# Patient Record
Sex: FEMALE | Race: WHITE | NOT HISPANIC OR LATINO | Employment: FULL TIME | URBAN - METROPOLITAN AREA
[De-identification: names, ages, dates, MRNs, and addresses within clinical notes are randomized per-mention and may not be internally consistent; named-entity substitution may affect disease eponyms.]

---

## 2023-03-21 DIAGNOSIS — E78.1 PURE HYPERGLYCERIDEMIA: ICD-10-CM

## 2023-03-21 DIAGNOSIS — E11.9 TYPE 2 DIABETES MELLITUS WITHOUT COMPLICATIONS (MULTI): ICD-10-CM

## 2023-03-22 RX ORDER — ROSUVASTATIN CALCIUM 20 MG/1
TABLET, COATED ORAL
Qty: 90 TABLET | Refills: 1 | Status: SHIPPED | OUTPATIENT
Start: 2023-03-22 | End: 2023-10-04

## 2023-03-22 RX ORDER — DAPAGLIFLOZIN 10 MG/1
TABLET, FILM COATED ORAL
Qty: 90 TABLET | Refills: 1 | Status: SHIPPED | OUTPATIENT
Start: 2023-03-22 | End: 2023-10-05

## 2023-04-08 DIAGNOSIS — E55.9 VITAMIN D DEFICIENCY, UNSPECIFIED: ICD-10-CM

## 2023-04-11 DIAGNOSIS — E66.9 OBESITY, UNSPECIFIED CLASSIFICATION, UNSPECIFIED OBESITY TYPE, UNSPECIFIED WHETHER SERIOUS COMORBIDITY PRESENT: ICD-10-CM

## 2023-04-11 DIAGNOSIS — E11.9 CONTROLLED TYPE 2 DIABETES MELLITUS WITHOUT COMPLICATION, WITHOUT LONG-TERM CURRENT USE OF INSULIN (MULTI): ICD-10-CM

## 2023-04-11 DIAGNOSIS — G35 MULTIPLE SCLEROSIS (MULTI): ICD-10-CM

## 2023-04-11 DIAGNOSIS — I10 BENIGN ESSENTIAL HYPERTENSION: ICD-10-CM

## 2023-04-11 DIAGNOSIS — E55.9 VITAMIN D DEFICIENCY: ICD-10-CM

## 2023-04-11 DIAGNOSIS — E78.00 ELEVATED LDL CHOLESTEROL LEVEL: ICD-10-CM

## 2023-04-11 DIAGNOSIS — J30.2 SEASONAL ALLERGIES: ICD-10-CM

## 2023-04-11 DIAGNOSIS — R79.89 ABNORMAL CBC: Primary | ICD-10-CM

## 2023-04-11 DIAGNOSIS — E78.1 FAMILIAL HYPERTRIGLYCERIDEMIA: ICD-10-CM

## 2023-04-11 RX ORDER — ASPIRIN 325 MG
TABLET, DELAYED RELEASE (ENTERIC COATED) ORAL
Qty: 12 CAPSULE | Refills: 1 | Status: SHIPPED | OUTPATIENT
Start: 2023-04-11 | End: 2023-09-22

## 2023-04-14 DIAGNOSIS — E11.9 TYPE 2 DIABETES MELLITUS WITHOUT COMPLICATIONS (MULTI): ICD-10-CM

## 2023-04-14 RX ORDER — METFORMIN HYDROCHLORIDE 500 MG/1
TABLET, EXTENDED RELEASE ORAL
Qty: 90 TABLET | Refills: 1 | Status: SHIPPED | OUTPATIENT
Start: 2023-04-14 | End: 2023-10-05

## 2023-04-17 ENCOUNTER — LAB (OUTPATIENT)
Dept: LAB | Facility: LAB | Age: 58
End: 2023-04-17
Payer: COMMERCIAL

## 2023-04-17 ENCOUNTER — DOCUMENTATION (OUTPATIENT)
Dept: PRIMARY CARE | Facility: CLINIC | Age: 58
End: 2023-04-17

## 2023-04-17 DIAGNOSIS — E78.00 ELEVATED LDL CHOLESTEROL LEVEL: ICD-10-CM

## 2023-04-17 DIAGNOSIS — G35 MULTIPLE SCLEROSIS (MULTI): ICD-10-CM

## 2023-04-17 DIAGNOSIS — I10 BENIGN ESSENTIAL HYPERTENSION: ICD-10-CM

## 2023-04-17 DIAGNOSIS — E78.1 FAMILIAL HYPERTRIGLYCERIDEMIA: ICD-10-CM

## 2023-04-17 DIAGNOSIS — R79.89 ABNORMAL CBC: ICD-10-CM

## 2023-04-17 DIAGNOSIS — J30.2 SEASONAL ALLERGIES: ICD-10-CM

## 2023-04-17 DIAGNOSIS — E66.9 OBESITY, UNSPECIFIED CLASSIFICATION, UNSPECIFIED OBESITY TYPE, UNSPECIFIED WHETHER SERIOUS COMORBIDITY PRESENT: ICD-10-CM

## 2023-04-17 DIAGNOSIS — E11.9 CONTROLLED TYPE 2 DIABETES MELLITUS WITHOUT COMPLICATION, WITHOUT LONG-TERM CURRENT USE OF INSULIN (MULTI): ICD-10-CM

## 2023-04-17 DIAGNOSIS — E55.9 VITAMIN D DEFICIENCY: ICD-10-CM

## 2023-04-17 LAB
ALANINE AMINOTRANSFERASE (SGPT) (U/L) IN SER/PLAS: 39 U/L (ref 7–45)
ALBUMIN (G/DL) IN SER/PLAS: 4.3 G/DL (ref 3.4–5)
ALBUMIN (MG/L) IN URINE: <7 MG/L
ALBUMIN/CREATININE (UG/MG) IN URINE: NORMAL UG/MG CRT (ref 0–30)
ALKALINE PHOSPHATASE (U/L) IN SER/PLAS: 63 U/L (ref 33–110)
ANION GAP IN SER/PLAS: 14 MMOL/L (ref 10–20)
ASPARTATE AMINOTRANSFERASE (SGOT) (U/L) IN SER/PLAS: 34 U/L (ref 9–39)
BILIRUBIN TOTAL (MG/DL) IN SER/PLAS: 0.8 MG/DL (ref 0–1.2)
CALCIDIOL (25 OH VITAMIN D3) (NG/ML) IN SER/PLAS: 66 NG/ML
CALCIUM (MG/DL) IN SER/PLAS: 9 MG/DL (ref 8.6–10.6)
CARBON DIOXIDE, TOTAL (MMOL/L) IN SER/PLAS: 27 MMOL/L (ref 21–32)
CHLORIDE (MMOL/L) IN SER/PLAS: 104 MMOL/L (ref 98–107)
CHOLESTEROL (MG/DL) IN SER/PLAS: 75 MG/DL (ref 0–199)
CHOLESTEROL IN HDL (MG/DL) IN SER/PLAS: 32.7 MG/DL
CHOLESTEROL/HDL RATIO: 2.3
CREATININE (MG/DL) IN SER/PLAS: 0.54 MG/DL (ref 0.5–1.05)
CREATININE (MG/DL) IN URINE: 23.7 MG/DL (ref 20–320)
ERYTHROCYTE DISTRIBUTION WIDTH (RATIO) BY AUTOMATED COUNT: 12.2 % (ref 11.5–14.5)
ERYTHROCYTE MEAN CORPUSCULAR HEMOGLOBIN CONCENTRATION (G/DL) BY AUTOMATED: 34.5 G/DL (ref 32–36)
ERYTHROCYTE MEAN CORPUSCULAR VOLUME (FL) BY AUTOMATED COUNT: 86 FL (ref 80–100)
ERYTHROCYTES (10*6/UL) IN BLOOD BY AUTOMATED COUNT: 4.81 X10E12/L (ref 4–5.2)
ESTIMATED AVERAGE GLUCOSE FOR HBA1C: 146 MG/DL
GFR FEMALE: >90 ML/MIN/1.73M2
GLUCOSE (MG/DL) IN SER/PLAS: 127 MG/DL (ref 74–99)
HEMATOCRIT (%) IN BLOOD BY AUTOMATED COUNT: 41.5 % (ref 36–46)
HEMOGLOBIN (G/DL) IN BLOOD: 14.3 G/DL (ref 12–16)
HEMOGLOBIN A1C/HEMOGLOBIN TOTAL IN BLOOD: 6.7 %
LDL: 23 MG/DL (ref 0–99)
LEUKOCYTES (10*3/UL) IN BLOOD BY AUTOMATED COUNT: 7.4 X10E9/L (ref 4.4–11.3)
NRBC (PER 100 WBCS) BY AUTOMATED COUNT: 0 /100 WBC (ref 0–0)
PLATELETS (10*3/UL) IN BLOOD AUTOMATED COUNT: 308 X10E9/L (ref 150–450)
POTASSIUM (MMOL/L) IN SER/PLAS: 4.2 MMOL/L (ref 3.5–5.3)
PROTEIN TOTAL: 6.7 G/DL (ref 6.4–8.2)
SODIUM (MMOL/L) IN SER/PLAS: 141 MMOL/L (ref 136–145)
THYROTROPIN (MIU/L) IN SER/PLAS BY DETECTION LIMIT <= 0.05 MIU/L: 3.67 MIU/L (ref 0.44–3.98)
TRIGLYCERIDE (MG/DL) IN SER/PLAS: 99 MG/DL (ref 0–149)
UREA NITROGEN (MG/DL) IN SER/PLAS: 9 MG/DL (ref 6–23)
VLDL: 20 MG/DL (ref 0–40)

## 2023-04-17 PROCEDURE — 82570 ASSAY OF URINE CREATININE: CPT

## 2023-04-17 PROCEDURE — 80061 LIPID PANEL: CPT

## 2023-04-17 PROCEDURE — 82043 UR ALBUMIN QUANTITATIVE: CPT

## 2023-04-17 PROCEDURE — 80053 COMPREHEN METABOLIC PANEL: CPT

## 2023-04-17 PROCEDURE — 85027 COMPLETE CBC AUTOMATED: CPT

## 2023-04-17 PROCEDURE — 84443 ASSAY THYROID STIM HORMONE: CPT

## 2023-04-17 PROCEDURE — 82306 VITAMIN D 25 HYDROXY: CPT

## 2023-04-17 PROCEDURE — 36415 COLL VENOUS BLD VENIPUNCTURE: CPT

## 2023-04-17 PROCEDURE — 83036 HEMOGLOBIN GLYCOSYLATED A1C: CPT

## 2023-04-17 NOTE — RESULT ENCOUNTER NOTE
HDL continues to be low at 32.7, continue to improve diet and exercise.    Hemoglobin A1c at 6.7, slightly up from last lab.  Still below diabetic goal of 7.    Remaining labs unremarkable.

## 2023-04-19 ENCOUNTER — OFFICE VISIT (OUTPATIENT)
Dept: PRIMARY CARE | Facility: CLINIC | Age: 58
End: 2023-04-19
Payer: COMMERCIAL

## 2023-04-19 VITALS
WEIGHT: 240 LBS | SYSTOLIC BLOOD PRESSURE: 144 MMHG | DIASTOLIC BLOOD PRESSURE: 84 MMHG | OXYGEN SATURATION: 96 % | HEART RATE: 90 BPM | BODY MASS INDEX: 37.67 KG/M2 | TEMPERATURE: 98.3 F | HEIGHT: 67 IN

## 2023-04-19 DIAGNOSIS — I10 BENIGN ESSENTIAL HYPERTENSION: Primary | ICD-10-CM

## 2023-04-19 DIAGNOSIS — G35 MULTIPLE SCLEROSIS (MULTI): ICD-10-CM

## 2023-04-19 DIAGNOSIS — E78.6 LOW HDL (UNDER 40): ICD-10-CM

## 2023-04-19 DIAGNOSIS — E11.9 CONTROLLED TYPE 2 DIABETES MELLITUS WITHOUT COMPLICATION, WITHOUT LONG-TERM CURRENT USE OF INSULIN (MULTI): ICD-10-CM

## 2023-04-19 DIAGNOSIS — E55.9 VITAMIN D DEFICIENCY: ICD-10-CM

## 2023-04-19 DIAGNOSIS — E78.1 FAMILIAL HYPERTRIGLYCERIDEMIA: ICD-10-CM

## 2023-04-19 PROBLEM — N95.1 MENOPAUSAL SYMPTOM: Status: ACTIVE | Noted: 2023-04-19

## 2023-04-19 PROBLEM — S86.009A ACHILLES TENDON INJURY: Status: ACTIVE | Noted: 2023-04-19

## 2023-04-19 PROBLEM — R09.81 NASAL CONGESTION: Status: ACTIVE | Noted: 2023-04-19

## 2023-04-19 PROBLEM — J02.9 SORE THROAT: Status: ACTIVE | Noted: 2023-04-19

## 2023-04-19 PROBLEM — R63.5 WEIGHT GAIN: Status: ACTIVE | Noted: 2023-04-19

## 2023-04-19 PROBLEM — N92.6 IRREGULAR MENSES: Status: ACTIVE | Noted: 2023-04-19

## 2023-04-19 PROCEDURE — 3044F HG A1C LEVEL LT 7.0%: CPT | Performed by: STUDENT IN AN ORGANIZED HEALTH CARE EDUCATION/TRAINING PROGRAM

## 2023-04-19 PROCEDURE — 3077F SYST BP >= 140 MM HG: CPT | Performed by: STUDENT IN AN ORGANIZED HEALTH CARE EDUCATION/TRAINING PROGRAM

## 2023-04-19 PROCEDURE — 1036F TOBACCO NON-USER: CPT | Performed by: STUDENT IN AN ORGANIZED HEALTH CARE EDUCATION/TRAINING PROGRAM

## 2023-04-19 PROCEDURE — 3079F DIAST BP 80-89 MM HG: CPT | Performed by: STUDENT IN AN ORGANIZED HEALTH CARE EDUCATION/TRAINING PROGRAM

## 2023-04-19 PROCEDURE — 99214 OFFICE O/P EST MOD 30 MIN: CPT | Performed by: STUDENT IN AN ORGANIZED HEALTH CARE EDUCATION/TRAINING PROGRAM

## 2023-04-19 RX ORDER — AMLODIPINE AND OLMESARTAN MEDOXOMIL 5; 20 MG/1; MG/1
1 TABLET ORAL DAILY
COMMUNITY
Start: 2021-09-01 | End: 2023-10-05

## 2023-04-19 RX ORDER — ASPIRIN 325 MG
1 TABLET, DELAYED RELEASE (ENTERIC COATED) ORAL
COMMUNITY
Start: 2015-07-02 | End: 2023-10-18 | Stop reason: SDUPTHER

## 2023-04-19 RX ORDER — FLUTICASONE PROPIONATE 50 MCG
2 SPRAY, SUSPENSION (ML) NASAL DAILY
COMMUNITY
End: 2024-01-22

## 2023-04-19 RX ORDER — ICOSAPENT ETHYL 1 G/1
2 CAPSULE ORAL 2 TIMES DAILY
COMMUNITY
End: 2023-08-29

## 2023-04-19 RX ORDER — GLATIRAMER 40 MG/ML
INJECTION, SOLUTION SUBCUTANEOUS
COMMUNITY
Start: 2015-01-05 | End: 2023-10-11 | Stop reason: SDUPTHER

## 2023-04-19 ASSESSMENT — PATIENT HEALTH QUESTIONNAIRE - PHQ9
2. FEELING DOWN, DEPRESSED OR HOPELESS: NOT AT ALL
1. LITTLE INTEREST OR PLEASURE IN DOING THINGS: NOT AT ALL
SUM OF ALL RESPONSES TO PHQ9 QUESTIONS 1 AND 2: 0

## 2023-04-19 ASSESSMENT — ENCOUNTER SYMPTOMS
DEPRESSION: 0
LOSS OF SENSATION IN FEET: 0
OCCASIONAL FEELINGS OF UNSTEADINESS: 0

## 2023-04-19 ASSESSMENT — COLUMBIA-SUICIDE SEVERITY RATING SCALE - C-SSRS
2. HAVE YOU ACTUALLY HAD ANY THOUGHTS OF KILLING YOURSELF?: NO
1. IN THE PAST MONTH, HAVE YOU WISHED YOU WERE DEAD OR WISHED YOU COULD GO TO SLEEP AND NOT WAKE UP?: NO
6. HAVE YOU EVER DONE ANYTHING, STARTED TO DO ANYTHING, OR PREPARED TO DO ANYTHING TO END YOUR LIFE?: NO

## 2023-04-19 NOTE — PROGRESS NOTES
57-year-old female presenting for follow-up on chronic conditions.  Doing relatively well without any new concerns.    HTN  Stable, tolerating current regimen well.    DMII  Stable, tolerating current regimen well.    Low HDL, hypertriglyceridemia  Stable, tolerating statin, continued low HDL.    Vitamin D deficiency  Stable, tolerating current regimen well.    MS  Stable, doing very well on current regimen.  Just establish with new neurologist.    12 point ROS reviewed and negative other than as stated in HPI    General: Alert, oriented, pleasant, in no acute distress  HEENT:      Head: normocephalic, atraumatic;      eyes: EOMI, no scleral icterus;   CV: Heart with regular rate and rhythm, normal S1/S2, no murmurs  Lungs: CTAB without wheezing, rhonchi or rales; good respiratory effort, no increased work of breathing  Neuro: Cranial nerves grossly intact; alert and oriented  Psych: Appropriate mood and affect    1.  HTN  -Continue amlodipine-olmesartan 5-20 mg daily    2. DMII  -Last A1C: 6.7   4/2023  -Current Medications: Farxiga 10 mg daily, metformin  mg daily (highest tolerated dose)  -Statin: rosuvastatin 20 mg daily  -ASA: 81 mg daily  -ACE/ARB: Olmesartan 20 mg daily    3. Low HDL 4. Hypertriglyceridemia  -HDL continuously below goal, recommend increased exercise  -Continue rosuvastatin 20 mg daily    5. Vitamin D deficiency  -Refill supplements    6.  MS  - Continue to follow with neurology    Follow-up CPE October    Christopher D'Amico, DO

## 2023-08-29 DIAGNOSIS — E78.1 PURE HYPERGLYCERIDEMIA: ICD-10-CM

## 2023-08-29 RX ORDER — ICOSAPENT ETHYL 1 G/1
2 CAPSULE ORAL 2 TIMES DAILY
Qty: 360 CAPSULE | Refills: 1 | Status: SHIPPED | OUTPATIENT
Start: 2023-08-29 | End: 2024-05-15

## 2023-09-22 DIAGNOSIS — E55.9 VITAMIN D DEFICIENCY, UNSPECIFIED: ICD-10-CM

## 2023-09-22 RX ORDER — ASPIRIN 325 MG
TABLET, DELAYED RELEASE (ENTERIC COATED) ORAL
Qty: 12 CAPSULE | Refills: 1 | Status: SHIPPED | OUTPATIENT
Start: 2023-09-22 | End: 2024-03-06

## 2023-10-04 DIAGNOSIS — E78.1 PURE HYPERGLYCERIDEMIA: ICD-10-CM

## 2023-10-04 DIAGNOSIS — E11.9 TYPE 2 DIABETES MELLITUS WITHOUT COMPLICATIONS (MULTI): ICD-10-CM

## 2023-10-04 DIAGNOSIS — I10 ESSENTIAL (PRIMARY) HYPERTENSION: ICD-10-CM

## 2023-10-04 RX ORDER — ROSUVASTATIN CALCIUM 20 MG/1
TABLET, COATED ORAL
Qty: 90 TABLET | Refills: 1 | Status: SHIPPED | OUTPATIENT
Start: 2023-10-04 | End: 2024-03-18

## 2023-10-05 RX ORDER — AMLODIPINE AND OLMESARTAN MEDOXOMIL 5; 20 MG/1; MG/1
1 TABLET ORAL DAILY
Qty: 90 TABLET | Refills: 1 | Status: SHIPPED | OUTPATIENT
Start: 2023-10-05 | End: 2024-03-18

## 2023-10-05 RX ORDER — METFORMIN HYDROCHLORIDE 500 MG/1
TABLET, EXTENDED RELEASE ORAL
Qty: 90 TABLET | Refills: 1 | Status: SHIPPED | OUTPATIENT
Start: 2023-10-05 | End: 2024-03-18

## 2023-10-05 RX ORDER — DAPAGLIFLOZIN 10 MG/1
10 TABLET, FILM COATED ORAL
Qty: 90 TABLET | Refills: 1 | Status: SHIPPED | OUTPATIENT
Start: 2023-10-05 | End: 2024-03-18

## 2023-10-09 DIAGNOSIS — I10 BENIGN ESSENTIAL HYPERTENSION: ICD-10-CM

## 2023-10-09 DIAGNOSIS — E78.1 FAMILIAL HYPERTRIGLYCERIDEMIA: ICD-10-CM

## 2023-10-09 DIAGNOSIS — G35 MULTIPLE SCLEROSIS (MULTI): ICD-10-CM

## 2023-10-09 DIAGNOSIS — E55.9 VITAMIN D DEFICIENCY: Primary | ICD-10-CM

## 2023-10-09 DIAGNOSIS — E11.9 CONTROLLED TYPE 2 DIABETES MELLITUS WITHOUT COMPLICATION, WITHOUT LONG-TERM CURRENT USE OF INSULIN (MULTI): ICD-10-CM

## 2023-10-09 DIAGNOSIS — E78.6 LOW HDL (UNDER 40): ICD-10-CM

## 2023-10-11 ENCOUNTER — TELEPHONE (OUTPATIENT)
Dept: NEUROLOGY | Facility: CLINIC | Age: 58
End: 2023-10-11
Payer: COMMERCIAL

## 2023-10-11 DIAGNOSIS — G35 MULTIPLE SCLEROSIS (MULTI): Primary | ICD-10-CM

## 2023-10-11 RX ORDER — GLATIRAMER 40 MG/ML
40 INJECTION, SOLUTION SUBCUTANEOUS 3 TIMES WEEKLY
Qty: 12 ML | Refills: 5 | Status: CANCELLED | OUTPATIENT
Start: 2023-10-11

## 2023-10-12 RX ORDER — GLATIRAMER 40 MG/ML
40 INJECTION, SOLUTION SUBCUTANEOUS 3 TIMES WEEKLY
Qty: 12 ML | Refills: 5 | Status: SHIPPED | OUTPATIENT
Start: 2023-10-13 | End: 2024-04-17

## 2023-10-16 ENCOUNTER — LAB (OUTPATIENT)
Dept: LAB | Facility: LAB | Age: 58
End: 2023-10-16
Payer: COMMERCIAL

## 2023-10-16 DIAGNOSIS — I10 BENIGN ESSENTIAL HYPERTENSION: ICD-10-CM

## 2023-10-16 DIAGNOSIS — G35 MULTIPLE SCLEROSIS (MULTI): ICD-10-CM

## 2023-10-16 DIAGNOSIS — E78.6 LOW HDL (UNDER 40): ICD-10-CM

## 2023-10-16 DIAGNOSIS — E78.1 FAMILIAL HYPERTRIGLYCERIDEMIA: ICD-10-CM

## 2023-10-16 DIAGNOSIS — E55.9 VITAMIN D DEFICIENCY: ICD-10-CM

## 2023-10-16 DIAGNOSIS — E11.9 CONTROLLED TYPE 2 DIABETES MELLITUS WITHOUT COMPLICATION, WITHOUT LONG-TERM CURRENT USE OF INSULIN (MULTI): ICD-10-CM

## 2023-10-16 LAB
25(OH)D3 SERPL-MCNC: 68 NG/ML (ref 30–100)
ALBUMIN SERPL BCP-MCNC: 4.1 G/DL (ref 3.4–5)
ALP SERPL-CCNC: 65 U/L (ref 33–110)
ALT SERPL W P-5'-P-CCNC: 45 U/L (ref 7–45)
ANION GAP SERPL CALC-SCNC: 14 MMOL/L (ref 10–20)
AST SERPL W P-5'-P-CCNC: 45 U/L (ref 9–39)
BILIRUB SERPL-MCNC: 0.8 MG/DL (ref 0–1.2)
BUN SERPL-MCNC: 10 MG/DL (ref 6–23)
CALCIUM SERPL-MCNC: 8.9 MG/DL (ref 8.6–10.6)
CHLORIDE SERPL-SCNC: 105 MMOL/L (ref 98–107)
CHOLEST SERPL-MCNC: 76 MG/DL (ref 0–199)
CHOLESTEROL/HDL RATIO: 2.1
CO2 SERPL-SCNC: 27 MMOL/L (ref 21–32)
CREAT SERPL-MCNC: 0.58 MG/DL (ref 0.5–1.05)
CREAT UR-MCNC: 195.5 MG/DL (ref 20–320)
ERYTHROCYTE [DISTWIDTH] IN BLOOD BY AUTOMATED COUNT: 11.7 % (ref 11.5–14.5)
EST. AVERAGE GLUCOSE BLD GHB EST-MCNC: 151 MG/DL
GFR SERPL CREATININE-BSD FRML MDRD: >90 ML/MIN/1.73M*2
GLUCOSE SERPL-MCNC: 123 MG/DL (ref 74–99)
HBA1C MFR BLD: 6.9 %
HCT VFR BLD AUTO: 44.1 % (ref 36–46)
HDLC SERPL-MCNC: 35.7 MG/DL
HGB BLD-MCNC: 14.7 G/DL (ref 12–16)
LDLC SERPL CALC-MCNC: 15 MG/DL
MCH RBC QN AUTO: 29.3 PG (ref 26–34)
MCHC RBC AUTO-ENTMCNC: 33.3 G/DL (ref 32–36)
MCV RBC AUTO: 88 FL (ref 80–100)
MICROALBUMIN UR-MCNC: 32.7 MG/L
MICROALBUMIN/CREAT UR: 16.7 UG/MG CREAT
NON HDL CHOLESTEROL: 40 MG/DL (ref 0–149)
NRBC BLD-RTO: 0 /100 WBCS (ref 0–0)
PLATELET # BLD AUTO: 306 X10*3/UL (ref 150–450)
PMV BLD AUTO: 9.3 FL (ref 7.5–11.5)
POTASSIUM SERPL-SCNC: 3.8 MMOL/L (ref 3.5–5.3)
PROT SERPL-MCNC: 6.9 G/DL (ref 6.4–8.2)
RBC # BLD AUTO: 5.02 X10*6/UL (ref 4–5.2)
SODIUM SERPL-SCNC: 142 MMOL/L (ref 136–145)
T4 FREE SERPL-MCNC: 1.05 NG/DL (ref 0.78–1.48)
TRIGL SERPL-MCNC: 127 MG/DL (ref 0–149)
TSH SERPL-ACNC: 4.77 MIU/L (ref 0.44–3.98)
VLDL: 25 MG/DL (ref 0–40)
WBC # BLD AUTO: 8.8 X10*3/UL (ref 4.4–11.3)

## 2023-10-16 PROCEDURE — 82570 ASSAY OF URINE CREATININE: CPT

## 2023-10-16 PROCEDURE — 84443 ASSAY THYROID STIM HORMONE: CPT

## 2023-10-16 PROCEDURE — 80053 COMPREHEN METABOLIC PANEL: CPT

## 2023-10-16 PROCEDURE — 83036 HEMOGLOBIN GLYCOSYLATED A1C: CPT

## 2023-10-16 PROCEDURE — 84439 ASSAY OF FREE THYROXINE: CPT

## 2023-10-16 PROCEDURE — 80061 LIPID PANEL: CPT

## 2023-10-16 PROCEDURE — 82306 VITAMIN D 25 HYDROXY: CPT

## 2023-10-16 PROCEDURE — 85027 COMPLETE CBC AUTOMATED: CPT

## 2023-10-16 PROCEDURE — 36415 COLL VENOUS BLD VENIPUNCTURE: CPT

## 2023-10-16 PROCEDURE — 82043 UR ALBUMIN QUANTITATIVE: CPT

## 2023-10-16 NOTE — RESULT ENCOUNTER NOTE
TSH borderline elevated, normal T4, has been normal for 5 years, will continue to monitor at next lab.    Hemoglobin A1c at 6.9, higher than we would want it at this age, though technically a diabetic goal of 7.  I would recommend considering additional medication for tighter blood control, but we can discuss at next appointment.    LDL is very good, HDL somewhat low at 35.7, would increase exercise, consider fish oil pills or increased olive oil in the diet    Remaining labs unremarkable.

## 2023-10-16 NOTE — LETTER
October 20, 2023     Thea Marcial  [address] [place] [address]      Dear Ms. Marcial:    Below are the results from your recent visit:      TSH borderline elevated, normal T4, has been normal for 5 years, will continue to monitor at next lab.     Hemoglobin A1c at 6.9, higher than we would want it at this age, though technically a diabetic goal of 7.  I would recommend considering additional medication for tighter blood control, but we can discuss at next appointment.     LDL is very good, HDL somewhat low at 35.7, would increase exercise, consider fish oil pills or increased olive oil in the diet     Remaining labs unremarkable.           If you have any questions or concerns, please don't hesitate to call.

## 2023-10-17 ENCOUNTER — TELEPHONE (OUTPATIENT)
Dept: PRIMARY CARE | Facility: CLINIC | Age: 58
End: 2023-10-17
Payer: COMMERCIAL

## 2023-10-18 ENCOUNTER — OFFICE VISIT (OUTPATIENT)
Dept: PRIMARY CARE | Facility: CLINIC | Age: 58
End: 2023-10-18
Payer: COMMERCIAL

## 2023-10-18 VITALS
OXYGEN SATURATION: 95 % | DIASTOLIC BLOOD PRESSURE: 80 MMHG | WEIGHT: 236 LBS | BODY MASS INDEX: 37.04 KG/M2 | HEART RATE: 73 BPM | SYSTOLIC BLOOD PRESSURE: 138 MMHG | HEIGHT: 67 IN

## 2023-10-18 DIAGNOSIS — E11.9 CONTROLLED TYPE 2 DIABETES MELLITUS WITHOUT COMPLICATION, WITHOUT LONG-TERM CURRENT USE OF INSULIN (MULTI): Primary | ICD-10-CM

## 2023-10-18 DIAGNOSIS — E78.6 LOW HDL (UNDER 40): ICD-10-CM

## 2023-10-18 DIAGNOSIS — I10 BENIGN ESSENTIAL HYPERTENSION: ICD-10-CM

## 2023-10-18 DIAGNOSIS — E78.1 FAMILIAL HYPERTRIGLYCERIDEMIA: ICD-10-CM

## 2023-10-18 DIAGNOSIS — Z00.00 WELLNESS EXAMINATION: ICD-10-CM

## 2023-10-18 DIAGNOSIS — G35 MULTIPLE SCLEROSIS (MULTI): ICD-10-CM

## 2023-10-18 DIAGNOSIS — E55.9 VITAMIN D DEFICIENCY: ICD-10-CM

## 2023-10-18 DIAGNOSIS — E78.00 ELEVATED LDL CHOLESTEROL LEVEL: ICD-10-CM

## 2023-10-18 PROBLEM — R09.81 NASAL SINUS CONGESTION: Status: ACTIVE | Noted: 2023-10-18

## 2023-10-18 PROCEDURE — 3044F HG A1C LEVEL LT 7.0%: CPT | Performed by: STUDENT IN AN ORGANIZED HEALTH CARE EDUCATION/TRAINING PROGRAM

## 2023-10-18 PROCEDURE — 3079F DIAST BP 80-89 MM HG: CPT | Performed by: STUDENT IN AN ORGANIZED HEALTH CARE EDUCATION/TRAINING PROGRAM

## 2023-10-18 PROCEDURE — 3060F POS MICROALBUMINURIA REV: CPT | Performed by: STUDENT IN AN ORGANIZED HEALTH CARE EDUCATION/TRAINING PROGRAM

## 2023-10-18 PROCEDURE — 1036F TOBACCO NON-USER: CPT | Performed by: STUDENT IN AN ORGANIZED HEALTH CARE EDUCATION/TRAINING PROGRAM

## 2023-10-18 PROCEDURE — 3048F LDL-C <100 MG/DL: CPT | Performed by: STUDENT IN AN ORGANIZED HEALTH CARE EDUCATION/TRAINING PROGRAM

## 2023-10-18 PROCEDURE — 99214 OFFICE O/P EST MOD 30 MIN: CPT | Performed by: STUDENT IN AN ORGANIZED HEALTH CARE EDUCATION/TRAINING PROGRAM

## 2023-10-18 PROCEDURE — 3075F SYST BP GE 130 - 139MM HG: CPT | Performed by: STUDENT IN AN ORGANIZED HEALTH CARE EDUCATION/TRAINING PROGRAM

## 2023-10-18 PROCEDURE — 99396 PREV VISIT EST AGE 40-64: CPT | Performed by: STUDENT IN AN ORGANIZED HEALTH CARE EDUCATION/TRAINING PROGRAM

## 2023-10-18 ASSESSMENT — ENCOUNTER SYMPTOMS
LOSS OF SENSATION IN FEET: 0
OCCASIONAL FEELINGS OF UNSTEADINESS: 0
DEPRESSION: 0

## 2023-10-18 ASSESSMENT — COLUMBIA-SUICIDE SEVERITY RATING SCALE - C-SSRS
1. IN THE PAST MONTH, HAVE YOU WISHED YOU WERE DEAD OR WISHED YOU COULD GO TO SLEEP AND NOT WAKE UP?: NO
2. HAVE YOU ACTUALLY HAD ANY THOUGHTS OF KILLING YOURSELF?: NO
6. HAVE YOU EVER DONE ANYTHING, STARTED TO DO ANYTHING, OR PREPARED TO DO ANYTHING TO END YOUR LIFE?: NO

## 2023-10-18 ASSESSMENT — PATIENT HEALTH QUESTIONNAIRE - PHQ9
SUM OF ALL RESPONSES TO PHQ9 QUESTIONS 1 AND 2: 0
2. FEELING DOWN, DEPRESSED OR HOPELESS: NOT AT ALL
1. LITTLE INTEREST OR PLEASURE IN DOING THINGS: NOT AT ALL

## 2023-10-18 NOTE — PROGRESS NOTES
HPI: 58-year-old female presenting for follow-up on chronic conditions, CPE.    HTN  Stable, tolerating current regimen well.     DMII  Stable, tolerating current regimen well.  A1c has up trended somewhat since last appointment     Low HDL, hypertriglyceridemia  Stable, tolerating statin and Vascepa, continued low HDL.     Vitamin D deficiency  Stable, tolerating current regimen well.     MS  Stable, doing very well on current regimen.    SocHx:   - Smoking: Never    Denies HA, changes in vision, chest pain, SOB/CERON, palpitations, N/V/D/C, dysuria/hematuria, hematochezia/melena, paresthesias, LE edema.    12 point ROS reviewed and negative other than as stated in HPI      General: Alert, oriented, pleasant, in no acute distress  HEENT:      Head: normocephalic, atraumatic;      eyes: EOMI, no scleral icterus;   Neck: soft, supple, non-tender, no masses appreciated  CV: Heart with regular rate and rhythm, normal S1/S2, no murmurs  Lungs: CTAB without wheezing, rhonchi or rales; good respiratory effort, no increased work of breathing  Abdomen: soft, non-tender, non-distended, no masses appreciated  Extremities: Trace edema bilaterally, no cyanosis  Neuro: Cranial nerves grossly intact; alert and oriented, normal gait  Psych: Appropriate mood and affect    #HM  - Labs done before visit, reviewed with patient  -Vaccines:       Flu: declines      Shingrix: Recommended, declines at this time      Pneumococcal: Recommended, declines at this time      Tdap: Recommended, declines at this time  -Yazan w/ desiree: 09/2023, negative  -Last PAP: 2019, negative  -Lung cancer screening with low-dose CT: Never smoker  -Colonoscopy: Cologuard negative 2022  -Osteoporosis screening: At 65    #HTN  -Continue amlodipine-olmesartan 5-20 mg daily     #DMII  -Last A1C: 6.9, 10/2023  -Current Medications: Farxiga 10 mg daily, metformin  mg daily (highest tolerated dose)  -Ophthalmology UTD  - Diabetic foot exam negative in  office  -Statin: rosuvastatin 20 mg daily  -ASA: 81 mg daily  -ACE/ARB: Olmesartan 20 mg daily  -Discussed with patient, would consider adding on GLP-1, likely could even discontinue metformin, as she is on a small dose anyway, deferred at this time by patient, will repeat in 6 months and reassess     #Low HDL 4. Hypertriglyceridemia  -HDL continuously below goal, recommend increased exercise  -Continue rosuvastatin 20 mg daily  - Triglycerides at goal, continue Vascepa     #Vitamin D deficiency  -At goal  - Continue with 50,000 units monthly     #MS  - Continue to follow with neurology    F/U 6 months or sooner if indicated    Chris D'Amico, DO

## 2023-12-20 ENCOUNTER — DOCUMENTATION (OUTPATIENT)
Dept: PHARMACY | Facility: HOSPITAL | Age: 58
End: 2023-12-20
Payer: COMMERCIAL

## 2023-12-20 NOTE — PROGRESS NOTES
Prior Authorization Request    Medication: vascepa 1 gm capsule   Quantity: #360 for 90 days  Diagnosis code: hypertriglyceridemia     PA response: Approved    Signed,  Hien Silva

## 2024-01-20 DIAGNOSIS — J30.2 OTHER SEASONAL ALLERGIC RHINITIS: ICD-10-CM

## 2024-01-22 RX ORDER — FLUTICASONE PROPIONATE 50 MCG
2 SPRAY, SUSPENSION (ML) NASAL DAILY
Qty: 48 ML | Refills: 1 | Status: SHIPPED | OUTPATIENT
Start: 2024-01-22

## 2024-01-24 NOTE — PROGRESS NOTES
"Chief Complaint  Follow up for MS      History of Present Illness     PRINCIPAL NEUROLOGIC DIAGNOSIS: REFUGIO     DISEASE SUMMARY/DIAGNOSTICS:  Onset: 2001  Diagnosis of MS: 2001 by MRI  Disease course at onset: R  Current disease course: R  Previous disease therapies: none  Current disease therapies: Copaxone since 2001  Most recent MRI brain: 7/21 prior 2018, stable with at least 1 periv and 2-3 juxtacort nonenhancing lesions, 2/23 stable  Most recent MRI cervical spine: 2018, cord lesions at C4-5, T1-2  Most recent MRI thoracic spine: unk  CSF: 2001, reportedly negative for OCB     History summary:  Used to follow with Dr. Stock for Stable relapsing remitting multiple sclerosis. \"Patient would like to switch from Copaxone to an oral medication and we have suggested Zeposia depending on insurance coverage or Ponvory\".  In 2001, August, right eye vision loss diagnosed with ON, described as she was having a scotoma, later resolved, did not receive steroids. Worked up for other dx, such as Lyme, etc. No relapses since. On Copaxone, tired of injections in the sense that she gets ISR, not fully convinced she wants to switch but definitely considering. Did well over the years, not more relapses since on Copaxone, MRI have been stable.      Interval Hx:  Occasionally gets a flushing feeling after the injection, no skin reactions otherwise. Denies new onset symptoms.       Current Outpatient Medications:     amlodipine-olmesartan (Vishnu) 5-20 mg tablet, TAKE 1 TABLET BY MOUTH EVERY DAY, Disp: 90 tablet, Rfl: 1    aspirin-calcium carbonate 81 mg-300 mg calcium(777 mg) tablet, Take 1 tablet by mouth once daily., Disp: , Rfl:     cholecalciferol (Vitamin D-3) 1,250 mcg (50,000 unit) capsule, TAKE 1 CAPSULE BY MOUTH ONE TIME PER WEEK, Disp: 12 capsule, Rfl: 1    dapagliflozin propanediol (Farxiga) 10 mg, Take 1 tablet (10 mg) by mouth once daily in the morning. Take before meals., Disp: 90 tablet, Rfl: 1    fluticasone (Flonase) " 50 mcg/actuation nasal spray, USE 2 SPRAYS INTO EACH NOSTRIL ONCE DAILY, Disp: 48 mL, Rfl: 1    glatiramer (COPAXONE) 40 mg/mL syringe, Inject 40 mg under the skin 3 times a week., Disp: 12 mL, Rfl: 5    icosapent ethyL (Vascepa) 1 gram capsule, TAKE 2 CAPSULES BY MOUTH TWICE A DAY, Disp: 360 capsule, Rfl: 1    metFORMIN  mg 24 hr tablet, TAKE 1 TABLET BY MOUTH EVERY DAY WITH THE EVENING MEAL, Disp: 90 tablet, Rfl: 1    rosuvastatin (Crestor) 20 mg tablet, TAKE 1 TABLET BY MOUTH EVERY DAY, Disp: 90 tablet, Rfl: 1     Physical Exam  The patient was alert and oriented to person, place, and time with normal language, attention and concentration, recent and remote memory, praxis, and intellectual function. Normal fund of knowledge.     Eye movements: normal fixation, no spontaneous or gaze-evoked nystagmus, normal speed and amplitude of horizontal and vertical saccades, no saccadic dysmetria, normal horizontal smooth pursuit. Facial sensation to light touch and pinprick was normal. Muscles of mastication and facial expression moved normally. There was no dysarthria.     Muscle Strenght (#/5):  Upper extremity                              Deltoids Right 5 Left 5  Biceps Right 5 Left 5  Triceps Right 5 Left 5   Right 5 Left 5  ADAM Right 5 Left 5  Lower extremity                              Iliopsoas Right 5 Left 5  Quadriceps Right 5 Left 5  Hamstrings Right 5 Left 5  Tibialis ant Right 5 Left 5  Gastrocn Right 5 Left 5        Light touch normal.      Standard gait was normal.     25-foot walk (sec):  5.82 Assistive device: None.  9-Hole peg test (sec) right: 20.54   left:  21.97    Provider Impressions  57 yo woman with known REFUGIO stable on Copaxone, which we decided to continue b/o being stable clinically and by MRI. Tolerates Copaxone overall and wishes to continue. MRI every ~2 years, RTC one year.     Importance of adopting a healthy lifestyle, including following a healthy diet, exercising, taking Vit D,  and of no smoking were discussed.

## 2024-01-25 ENCOUNTER — OFFICE VISIT (OUTPATIENT)
Dept: NEUROLOGY | Facility: CLINIC | Age: 59
End: 2024-01-25
Payer: COMMERCIAL

## 2024-01-25 VITALS
HEART RATE: 81 BPM | DIASTOLIC BLOOD PRESSURE: 83 MMHG | RESPIRATION RATE: 18 BRPM | WEIGHT: 244 LBS | BODY MASS INDEX: 38.22 KG/M2 | SYSTOLIC BLOOD PRESSURE: 156 MMHG

## 2024-01-25 DIAGNOSIS — G35 MULTIPLE SCLEROSIS (MULTI): Primary | ICD-10-CM

## 2024-01-25 PROCEDURE — 1036F TOBACCO NON-USER: CPT | Performed by: PSYCHIATRY & NEUROLOGY

## 2024-01-25 PROCEDURE — 3079F DIAST BP 80-89 MM HG: CPT | Performed by: PSYCHIATRY & NEUROLOGY

## 2024-01-25 PROCEDURE — 99213 OFFICE O/P EST LOW 20 MIN: CPT | Performed by: PSYCHIATRY & NEUROLOGY

## 2024-01-25 PROCEDURE — 3077F SYST BP >= 140 MM HG: CPT | Performed by: PSYCHIATRY & NEUROLOGY

## 2024-01-25 NOTE — PATIENT INSTRUCTIONS
Thea, your MS seems pretty stable and the MRI was stable  as well. I will see you back in one year.   Continue with Glatiramer. Will look into what pharmacy is best for you.  Please increase your level of exercise.

## 2024-03-06 DIAGNOSIS — E55.9 VITAMIN D DEFICIENCY, UNSPECIFIED: ICD-10-CM

## 2024-03-06 RX ORDER — ASPIRIN 325 MG
TABLET, DELAYED RELEASE (ENTERIC COATED) ORAL
Qty: 12 CAPSULE | Refills: 1 | Status: SHIPPED | OUTPATIENT
Start: 2024-03-06

## 2024-03-17 DIAGNOSIS — I10 ESSENTIAL (PRIMARY) HYPERTENSION: ICD-10-CM

## 2024-03-17 DIAGNOSIS — E78.1 PURE HYPERGLYCERIDEMIA: ICD-10-CM

## 2024-03-17 DIAGNOSIS — E11.9 TYPE 2 DIABETES MELLITUS WITHOUT COMPLICATIONS (MULTI): ICD-10-CM

## 2024-03-18 RX ORDER — DAPAGLIFLOZIN 10 MG/1
10 TABLET, FILM COATED ORAL
Qty: 90 TABLET | Refills: 1 | Status: SHIPPED | OUTPATIENT
Start: 2024-03-18

## 2024-03-18 RX ORDER — AMLODIPINE AND OLMESARTAN MEDOXOMIL 5; 20 MG/1; MG/1
1 TABLET ORAL DAILY
Qty: 90 TABLET | Refills: 1 | Status: SHIPPED | OUTPATIENT
Start: 2024-03-18

## 2024-03-18 RX ORDER — METFORMIN HYDROCHLORIDE 500 MG/1
TABLET, EXTENDED RELEASE ORAL
Qty: 90 TABLET | Refills: 1 | Status: SHIPPED | OUTPATIENT
Start: 2024-03-18

## 2024-03-18 RX ORDER — ROSUVASTATIN CALCIUM 20 MG/1
TABLET, COATED ORAL
Qty: 90 TABLET | Refills: 1 | Status: SHIPPED | OUTPATIENT
Start: 2024-03-18

## 2024-03-26 ENCOUNTER — SPECIALTY PHARMACY (OUTPATIENT)
Dept: PHARMACY | Facility: CLINIC | Age: 59
End: 2024-03-26

## 2024-04-11 DIAGNOSIS — E78.6 LOW HDL (UNDER 40): ICD-10-CM

## 2024-04-11 DIAGNOSIS — I10 BENIGN ESSENTIAL HYPERTENSION: ICD-10-CM

## 2024-04-11 DIAGNOSIS — E11.9 CONTROLLED TYPE 2 DIABETES MELLITUS WITHOUT COMPLICATION, WITHOUT LONG-TERM CURRENT USE OF INSULIN (MULTI): ICD-10-CM

## 2024-04-11 DIAGNOSIS — R79.89 ELEVATED TSH: ICD-10-CM

## 2024-04-11 DIAGNOSIS — E78.1 FAMILIAL HYPERTRIGLYCERIDEMIA: ICD-10-CM

## 2024-04-11 DIAGNOSIS — E78.00 ELEVATED LDL CHOLESTEROL LEVEL: ICD-10-CM

## 2024-04-11 DIAGNOSIS — E55.9 VITAMIN D DEFICIENCY: Primary | ICD-10-CM

## 2024-04-11 DIAGNOSIS — G35 MULTIPLE SCLEROSIS (MULTI): ICD-10-CM

## 2024-04-15 DIAGNOSIS — G35 MULTIPLE SCLEROSIS (MULTI): ICD-10-CM

## 2024-04-17 RX ORDER — GLATIRAMER 40 MG/ML
INJECTION, SOLUTION SUBCUTANEOUS
Qty: 12 ML | Refills: 5 | Status: SHIPPED | OUTPATIENT
Start: 2024-04-17

## 2024-04-22 ENCOUNTER — LAB (OUTPATIENT)
Dept: LAB | Facility: LAB | Age: 59
End: 2024-04-22
Payer: COMMERCIAL

## 2024-04-22 DIAGNOSIS — E78.00 ELEVATED LDL CHOLESTEROL LEVEL: ICD-10-CM

## 2024-04-22 DIAGNOSIS — R79.89 ELEVATED TSH: ICD-10-CM

## 2024-04-22 DIAGNOSIS — E11.9 CONTROLLED TYPE 2 DIABETES MELLITUS WITHOUT COMPLICATION, WITHOUT LONG-TERM CURRENT USE OF INSULIN (MULTI): ICD-10-CM

## 2024-04-22 DIAGNOSIS — E78.1 FAMILIAL HYPERTRIGLYCERIDEMIA: ICD-10-CM

## 2024-04-22 DIAGNOSIS — E55.9 VITAMIN D DEFICIENCY: ICD-10-CM

## 2024-04-22 DIAGNOSIS — G35 MULTIPLE SCLEROSIS (MULTI): ICD-10-CM

## 2024-04-22 DIAGNOSIS — E78.6 LOW HDL (UNDER 40): ICD-10-CM

## 2024-04-22 DIAGNOSIS — I10 BENIGN ESSENTIAL HYPERTENSION: ICD-10-CM

## 2024-04-22 LAB
25(OH)D3 SERPL-MCNC: 57 NG/ML (ref 30–100)
ALBUMIN SERPL BCP-MCNC: 4.2 G/DL (ref 3.4–5)
ALP SERPL-CCNC: 60 U/L (ref 33–110)
ALT SERPL W P-5'-P-CCNC: 41 U/L (ref 7–45)
ANION GAP SERPL CALC-SCNC: 13 MMOL/L (ref 10–20)
AST SERPL W P-5'-P-CCNC: 36 U/L (ref 9–39)
BILIRUB SERPL-MCNC: 0.9 MG/DL (ref 0–1.2)
BUN SERPL-MCNC: 10 MG/DL (ref 6–23)
CALCIUM SERPL-MCNC: 9.1 MG/DL (ref 8.6–10.6)
CHLORIDE SERPL-SCNC: 103 MMOL/L (ref 98–107)
CHOLEST SERPL-MCNC: 85 MG/DL (ref 0–199)
CHOLESTEROL/HDL RATIO: 2.4
CO2 SERPL-SCNC: 28 MMOL/L (ref 21–32)
CREAT SERPL-MCNC: 0.58 MG/DL (ref 0.5–1.05)
EGFRCR SERPLBLD CKD-EPI 2021: >90 ML/MIN/1.73M*2
ERYTHROCYTE [DISTWIDTH] IN BLOOD BY AUTOMATED COUNT: 12.2 % (ref 11.5–14.5)
EST. AVERAGE GLUCOSE BLD GHB EST-MCNC: 146 MG/DL
GLUCOSE SERPL-MCNC: 138 MG/DL (ref 74–99)
HBA1C MFR BLD: 6.7 %
HCT VFR BLD AUTO: 41 % (ref 36–46)
HDLC SERPL-MCNC: 35.3 MG/DL
HGB BLD-MCNC: 14 G/DL (ref 12–16)
LDLC SERPL CALC-MCNC: 25 MG/DL
MCH RBC QN AUTO: 29 PG (ref 26–34)
MCHC RBC AUTO-ENTMCNC: 34.1 G/DL (ref 32–36)
MCV RBC AUTO: 85 FL (ref 80–100)
NON HDL CHOLESTEROL: 50 MG/DL (ref 0–149)
NRBC BLD-RTO: 0 /100 WBCS (ref 0–0)
PLATELET # BLD AUTO: 308 X10*3/UL (ref 150–450)
POTASSIUM SERPL-SCNC: 4.2 MMOL/L (ref 3.5–5.3)
PROT SERPL-MCNC: 6.7 G/DL (ref 6.4–8.2)
RBC # BLD AUTO: 4.82 X10*6/UL (ref 4–5.2)
SODIUM SERPL-SCNC: 140 MMOL/L (ref 136–145)
TRIGL SERPL-MCNC: 122 MG/DL (ref 0–149)
TSH SERPL-ACNC: 3.63 MIU/L (ref 0.44–3.98)
VLDL: 24 MG/DL (ref 0–40)
WBC # BLD AUTO: 9.4 X10*3/UL (ref 4.4–11.3)

## 2024-04-22 PROCEDURE — 85027 COMPLETE CBC AUTOMATED: CPT

## 2024-04-22 PROCEDURE — 82306 VITAMIN D 25 HYDROXY: CPT

## 2024-04-22 PROCEDURE — 36415 COLL VENOUS BLD VENIPUNCTURE: CPT

## 2024-04-22 PROCEDURE — 80053 COMPREHEN METABOLIC PANEL: CPT

## 2024-04-22 PROCEDURE — 83036 HEMOGLOBIN GLYCOSYLATED A1C: CPT

## 2024-04-22 PROCEDURE — 84443 ASSAY THYROID STIM HORMONE: CPT

## 2024-04-22 PROCEDURE — 80061 LIPID PANEL: CPT

## 2024-04-23 ENCOUNTER — TELEPHONE (OUTPATIENT)
Dept: PRIMARY CARE | Facility: CLINIC | Age: 59
End: 2024-04-23

## 2024-04-23 NOTE — RESULT ENCOUNTER NOTE
Hemoglobin A1c at 6.7, will technically a diabetic goal, as we discussed at last appointment, I do think she would benefit from medication such as Ozempic or Mounjaro for better blood sugar control.  If she is amenable, we can have Hien start her on that.    Remaining labs look good

## 2024-04-23 NOTE — TELEPHONE ENCOUNTER
----- Message from Christopher D'Amico, DO sent at 4/23/2024  9:45 AM EDT -----  Hemoglobin A1c at 6.7, will technically a diabetic goal, as we discussed at last appointment, I do think she would benefit from medication such as Ozempic or Mounjaro for better blood sugar control.  If she is amenable, we can have Hien start her on that.    Remaining labs look good

## 2024-04-23 NOTE — TELEPHONE ENCOUNTER
Result Communication    Resulted Orders   CBC   Result Value Ref Range    WBC 9.4 4.4 - 11.3 x10*3/uL    nRBC 0.0 0.0 - 0.0 /100 WBCs    RBC 4.82 4.00 - 5.20 x10*6/uL    Hemoglobin 14.0 12.0 - 16.0 g/dL    Hematocrit 41.0 36.0 - 46.0 %    MCV 85 80 - 100 fL    MCH 29.0 26.0 - 34.0 pg    MCHC 34.1 32.0 - 36.0 g/dL    RDW 12.2 11.5 - 14.5 %    Platelets 308 150 - 450 x10*3/uL   Lipid Panel   Result Value Ref Range    Cholesterol 85 0 - 199 mg/dL      Comment:            Age      Desirable   Borderline High   High     0-19 Y     0 - 169       170 - 199     >/= 200    20-24 Y     0 - 189       190 - 224     >/= 225         >24 Y     0 - 199       200 - 239     >/= 240   **All ranges are based on fasting samples. Specific   therapeutic targets will vary based on patient-specific   cardiac risk.    Pediatric guidelines reference:Pediatrics 2011, 128(S5).Adult guidelines reference: NCEP ATPIII Guidelines,ADRIEN 2001, 258:2486-97    Venipuncture immediately after or during the administration of Metamizole may lead to falsely low results. Testing should be performed immediately prior to Metamizole dosing.    HDL-Cholesterol 35.3 mg/dL      Comment:        Age       Very Low   Low     Normal    High    0-19 Y    < 35      < 40     40-45     ----  20-24 Y    ----     < 40      >45      ----        >24 Y      ----     < 40     40-60      >60      Cholesterol/HDL Ratio 2.4       Comment:        Ref Values  Desirable  < 3.4  High Risk  > 5.0    LDL Calculated 25 <=99 mg/dL      Comment:                                  Near   Borderline      AGE      Desirable  Optimal    High     High     Very High     0-19 Y     0 - 109     ---    110-129   >/= 130     ----    20-24 Y     0 - 119     ---    120-159   >/= 160     ----      >24 Y     0 -  99   100-129  130-159   160-189     >/=190      VLDL 24 0 - 40 mg/dL    Triglycerides 122 0 - 149 mg/dL      Comment:         Age         Desirable   Borderline High   High     Very High   0 D-90  D    19 - 174         ----         ----        ----  91 D- 9 Y     0 -  74        75 -  99     >/= 100      ----    10-19 Y     0 -  89        90 - 129     >/= 130      ----    20-24 Y     0 - 114       115 - 149     >/= 150      ----         >24 Y     0 - 149       150 - 199    200- 499    >/= 500    Venipuncture immediately after or during the administration of Metamizole may lead to falsely low results. Testing should be performed immediately prior to Metamizole dosing.    Non HDL Cholesterol 50 0 - 149 mg/dL      Comment:            Age       Desirable   Borderline High   High     Very High     0-19 Y     0 - 119       120 - 144     >/= 145    >/= 160    20-24 Y     0 - 149       150 - 189     >/= 190      ----         >24 Y    30 mg/dL above LDL Cholesterol goal     Comprehensive Metabolic Panel   Result Value Ref Range    Glucose 138 (H) 74 - 99 mg/dL    Sodium 140 136 - 145 mmol/L    Potassium 4.2 3.5 - 5.3 mmol/L    Chloride 103 98 - 107 mmol/L    Bicarbonate 28 21 - 32 mmol/L    Anion Gap 13 10 - 20 mmol/L    Urea Nitrogen 10 6 - 23 mg/dL    Creatinine 0.58 0.50 - 1.05 mg/dL    eGFR >90 >60 mL/min/1.73m*2      Comment:      Calculations of estimated GFR are performed using the 2021 CKD-EPI Study Refit equation without the race variable for the IDMS-Traceable creatinine methods.  https://jasn.asnjournals.org/content/early/2021/09/22/ASN.7640255882    Calcium 9.1 8.6 - 10.6 mg/dL    Albumin 4.2 3.4 - 5.0 g/dL    Alkaline Phosphatase 60 33 - 110 U/L    Total Protein 6.7 6.4 - 8.2 g/dL    AST 36 9 - 39 U/L    Bilirubin, Total 0.9 0.0 - 1.2 mg/dL    ALT 41 7 - 45 U/L      Comment:      Patients treated with Sulfasalazine may generate falsely decreased results for ALT.   TSH with reflex to Free T4 if abnormal   Result Value Ref Range    Thyroid Stimulating Hormone 3.63 0.44 - 3.98 mIU/L    Narrative    TSH testing is performed using different testing methodology at [facility] than at other system  [facility]. Direct result comparisons should only be made within the same method.     Vitamin D 25-Hydroxy,Total (for eval of Vitamin D levels)   Result Value Ref Range    Vitamin D, 25-Hydroxy, Total 57 30 - 100 ng/mL    Narrative    Deficiency:         < 20   ng/ml  Insufficiency:      20-29  ng/ml  Sufficiency:         ng/ml  This assay accurately quantifies the sum of Vitamin D3, 25-Hydroxy and Vitamin D2,25-Hydroxy.   Hemoglobin A1C   Result Value Ref Range    Hemoglobin A1C 6.7 (H) see below %    Estimated Average Glucose 146 Not Established mg/dL    Narrative    Diagnosis of Diabetes-Adults  Non-Diabetic: < or = 5.6%  Increased risk for developing diabetes: 5.7-6.4%  Diagnostic of diabetes: > or = 6.5%    Monitoring of Diabetes  Age (y)....................... Therapeutic Goal (%)  Adults: >18.........................<7.0  Pediatrics: 13-18...................<7.5  Pediatrics: 7-12....................<8.0  Pediatrics: 0-6..................... 7.5-8.5    American Diabetes Association. Diabetes Care 33(S1), Jan 2010           11:09 AM      Results were not successfully communicated with the patient and they did not acknowledge their understanding.

## 2024-04-24 ENCOUNTER — OFFICE VISIT (OUTPATIENT)
Dept: PRIMARY CARE | Facility: CLINIC | Age: 59
End: 2024-04-24
Payer: COMMERCIAL

## 2024-04-24 VITALS
BODY MASS INDEX: 37.67 KG/M2 | HEART RATE: 76 BPM | DIASTOLIC BLOOD PRESSURE: 66 MMHG | OXYGEN SATURATION: 97 % | SYSTOLIC BLOOD PRESSURE: 132 MMHG | WEIGHT: 240 LBS | HEIGHT: 67 IN

## 2024-04-24 DIAGNOSIS — G35 MULTIPLE SCLEROSIS (MULTI): ICD-10-CM

## 2024-04-24 DIAGNOSIS — E78.6 LOW HDL (UNDER 40): ICD-10-CM

## 2024-04-24 DIAGNOSIS — E78.00 ELEVATED LDL CHOLESTEROL LEVEL: ICD-10-CM

## 2024-04-24 DIAGNOSIS — E11.9 CONTROLLED TYPE 2 DIABETES MELLITUS WITHOUT COMPLICATION, WITHOUT LONG-TERM CURRENT USE OF INSULIN (MULTI): ICD-10-CM

## 2024-04-24 DIAGNOSIS — I10 BENIGN ESSENTIAL HYPERTENSION: Primary | ICD-10-CM

## 2024-04-24 DIAGNOSIS — E78.1 FAMILIAL HYPERTRIGLYCERIDEMIA: ICD-10-CM

## 2024-04-24 PROBLEM — R09.81 CONGESTION OF PARANASAL SINUS: Status: ACTIVE | Noted: 2023-10-18

## 2024-04-24 PROBLEM — S86.009A INJURY OF ACHILLES TENDON: Status: ACTIVE | Noted: 2023-04-19

## 2024-04-24 PROBLEM — R79.89 ABNORMAL COMPLETE BLOOD COUNT: Status: ACTIVE | Noted: 2023-02-20

## 2024-04-24 PROBLEM — N92.6 IRREGULAR MENSTRUAL CYCLE: Status: ACTIVE | Noted: 2023-04-19

## 2024-04-24 PROCEDURE — 1036F TOBACCO NON-USER: CPT | Performed by: STUDENT IN AN ORGANIZED HEALTH CARE EDUCATION/TRAINING PROGRAM

## 2024-04-24 PROCEDURE — 3078F DIAST BP <80 MM HG: CPT | Performed by: STUDENT IN AN ORGANIZED HEALTH CARE EDUCATION/TRAINING PROGRAM

## 2024-04-24 PROCEDURE — 3075F SYST BP GE 130 - 139MM HG: CPT | Performed by: STUDENT IN AN ORGANIZED HEALTH CARE EDUCATION/TRAINING PROGRAM

## 2024-04-24 PROCEDURE — 3048F LDL-C <100 MG/DL: CPT | Performed by: STUDENT IN AN ORGANIZED HEALTH CARE EDUCATION/TRAINING PROGRAM

## 2024-04-24 PROCEDURE — 99214 OFFICE O/P EST MOD 30 MIN: CPT | Performed by: STUDENT IN AN ORGANIZED HEALTH CARE EDUCATION/TRAINING PROGRAM

## 2024-04-24 PROCEDURE — 3044F HG A1C LEVEL LT 7.0%: CPT | Performed by: STUDENT IN AN ORGANIZED HEALTH CARE EDUCATION/TRAINING PROGRAM

## 2024-04-24 RX ORDER — OMEGA-3-ACID ETHYL ESTERS 1 G/1
CAPSULE, LIQUID FILLED ORAL EVERY 12 HOURS
COMMUNITY
Start: 2015-01-15 | End: 2024-05-14 | Stop reason: ALTCHOICE

## 2024-04-24 ASSESSMENT — PATIENT HEALTH QUESTIONNAIRE - PHQ9
2. FEELING DOWN, DEPRESSED OR HOPELESS: NOT AT ALL
SUM OF ALL RESPONSES TO PHQ9 QUESTIONS 1 AND 2: 0
1. LITTLE INTEREST OR PLEASURE IN DOING THINGS: NOT AT ALL

## 2024-04-24 ASSESSMENT — ENCOUNTER SYMPTOMS
OCCASIONAL FEELINGS OF UNSTEADINESS: 0
DEPRESSION: 0
LOSS OF SENSATION IN FEET: 0

## 2024-04-24 ASSESSMENT — COLUMBIA-SUICIDE SEVERITY RATING SCALE - C-SSRS
1. IN THE PAST MONTH, HAVE YOU WISHED YOU WERE DEAD OR WISHED YOU COULD GO TO SLEEP AND NOT WAKE UP?: NO
6. HAVE YOU EVER DONE ANYTHING, STARTED TO DO ANYTHING, OR PREPARED TO DO ANYTHING TO END YOUR LIFE?: NO
2. HAVE YOU ACTUALLY HAD ANY THOUGHTS OF KILLING YOURSELF?: NO

## 2024-04-24 NOTE — PROGRESS NOTES
HPI: 58-year-old female presenting for follow-up on chronic conditions.    HTN  Stable, tolerating current regimen well.     DMII  Stable, tolerating current regimen well.       Low HDL, hypertriglyceridemia  Stable, tolerating statin and Vascepa, continued low HDL.     Vitamin D deficiency  Stable, tolerating current regimen well.     MS  Stable, doing very well on current regimen.    SocHx:   - Smoking: Never    12 point ROS reviewed and negative other than as stated in HPI      General: Alert, oriented, pleasant, in no acute distress  HEENT:      Head: normocephalic, atraumatic;      eyes: EOMI, no scleral icterus;   Neck: soft, supple, non-tender, no masses appreciated  CV: Heart with regular rate and rhythm, normal S1/S2, no murmurs  Lungs: CTAB without wheezing, rhonchi or rales; good respiratory effort, no increased work of breathing  Extremities: Trace edema bilaterally, no cyanosis  Neuro: Cranial nerves grossly intact; alert and oriented  Psych: Appropriate mood and affect    #HTN  -At goal in office  -Continue amlodipine-olmesartan 5-20 mg daily     #DMII  -Last A1C: 6.7, 04/2024  -Current Medications: Farxiga 10 mg daily, metformin  mg daily (highest tolerated dose)  -Ophthalmology UTD  - Diabetic foot exam negative 10/2024  -Statin: rosuvastatin 20 mg daily  -ASA: 81 mg daily  -ACE/ARB: Olmesartan 20 mg daily  - We are going to try to start Rybelsus, patient cannot tolerate injections well, pharmacist is submitting PA     #Low HDL #Hypertriglyceridemia  -HDL continuously below goal, recommend increased exercise  -Continue rosuvastatin 20 mg daily  - Triglycerides at goal, continue Vascepa     #Vitamin D deficiency  -At goal  - Continue with 50,000 units monthly     #MS  - Continue to follow with neurology    F/U 6 months or sooner if indicated, CPE at that time    Chris D'Amico, DO

## 2024-04-29 DIAGNOSIS — E11.9 CONTROLLED TYPE 2 DIABETES MELLITUS WITHOUT COMPLICATION, WITHOUT LONG-TERM CURRENT USE OF INSULIN (MULTI): Primary | ICD-10-CM

## 2024-05-14 ENCOUNTER — TELEMEDICINE (OUTPATIENT)
Dept: PHARMACY | Facility: HOSPITAL | Age: 59
End: 2024-05-14
Payer: COMMERCIAL

## 2024-05-14 DIAGNOSIS — E78.1 PURE HYPERGLYCERIDEMIA: ICD-10-CM

## 2024-05-14 DIAGNOSIS — E11.9 CONTROLLED TYPE 2 DIABETES MELLITUS WITHOUT COMPLICATION, WITHOUT LONG-TERM CURRENT USE OF INSULIN (MULTI): Primary | ICD-10-CM

## 2024-05-14 NOTE — PROGRESS NOTES
Pharmacist Clinic: Diabetes Management  Thea Marcial is a 58 y.o. female was referred to Clinical Pharmacy Team for diabetes management.     Referring Provider: Christopher D'Amico, DO     HISTORY OF PRESENT ILLNESS  Approximate Date of Diagnosis: 2+ years ago   Known complications due to diabetes included obesity; BMI of 37.59    Diet:   - Patient works second shift, she has 1-2 meals per day and overall eats relatively healthy  - She meal preps dinners for the week     LAB REVIEW   Glucose (mg/dL)   Date Value   04/22/2024 138 (H)   10/16/2023 123 (H)   04/17/2023 127 (H)   10/10/2022 133 (H)   04/11/2022 114 (H)     Hemoglobin A1C (%)   Date Value   04/22/2024 6.7 (H)   10/16/2023 6.9 (H)   04/17/2023 6.7 (A)   10/10/2022 6.3 (A)   04/11/2022 6.2 (A)     Bicarbonate (mmol/L)   Date Value   04/22/2024 28   10/16/2023 27   04/17/2023 27   10/10/2022 27   04/11/2022 23     Urea Nitrogen (mg/dL)   Date Value   04/22/2024 10   10/16/2023 10   04/17/2023 9   10/10/2022 9   04/11/2022 10     Creatinine (mg/dL)   Date Value   04/22/2024 0.58   10/16/2023 0.58   04/17/2023 0.54   10/10/2022 0.53   04/11/2022 0.60     Lab Results   Component Value Date    HGBA1C 6.7 (H) 04/22/2024    HGBA1C 6.9 (H) 10/16/2023    HGBA1C 6.7 (A) 04/17/2023     Lab Results   Component Value Date    CHOL 85 04/22/2024    CHOL 76 10/16/2023    CHOL 75 04/17/2023     Lab Results   Component Value Date    HDL 35.3 04/22/2024    HDL 35.7 10/16/2023    HDL 32.7 (A) 04/17/2023     Lab Results   Component Value Date    LDLCALC 25 04/22/2024    LDLCALC 15 10/16/2023     Lab Results   Component Value Date    TRIG 122 04/22/2024    TRIG 127 10/16/2023    TRIG 99 04/17/2023       DIABETES ASSESSMENT    CURRENT PHARMACOTHERAPY  - metformin  mg once daily (higher doses causes GI upset)   - farxiga 10 mg once daily     SECONDARY PREVENTION  - Statin? Yes  - ACE-I/ARB? Yes    GLP1 Screening:   - pancreatitis: no   - MTC/MEN2: no     SMBG  MEASUREMENTS: patient does not test    DISCUSSION:  Patient is tolerating current medication regimen, however there is opportunity to optimize pharmacotherapy given comorbidities   Discussed GLP1 pharmacotherapy   Patient is not interested in injectables at this time due to potnetial for injection site reactiosn which she already gets from her MS medications   Went over rybelsus with the patient   Counseled patient on the medication, MOA, expectations, side effects,etc   Answered all questions and concerns    RECOMMENDATIONS/PLAN  1. Patients diabetes is stable with most recent A1c of 6.7 % (goal < 7 %).   - Continue all meds under the continuation of care with the referring provider and clinical pharmacy team.  - Initiate rybelsus 3 mg once daily.     Clinical Pharmacist follow up: 3 weeks    Thank you,  Hien Silva, PharmD    Verbal consent to manage patient's drug therapy was obtained from the patient. They were informed they may decline to participate or withdraw from participation in pharmacy services at any time.

## 2024-05-15 RX ORDER — ICOSAPENT ETHYL 1 G/1
CAPSULE ORAL 2 TIMES DAILY
Qty: 360 CAPSULE | Refills: 1 | Status: SHIPPED | OUTPATIENT
Start: 2024-05-15

## 2024-06-04 ENCOUNTER — TELEMEDICINE (OUTPATIENT)
Dept: PHARMACY | Facility: HOSPITAL | Age: 59
End: 2024-06-04
Payer: COMMERCIAL

## 2024-06-04 DIAGNOSIS — E11.9 CONTROLLED TYPE 2 DIABETES MELLITUS WITHOUT COMPLICATION, WITHOUT LONG-TERM CURRENT USE OF INSULIN (MULTI): Primary | ICD-10-CM

## 2024-06-04 NOTE — PROGRESS NOTES
Pharmacist Clinic: Diabetes Management  Thea Marcial is a 58 y.o. female was referred to Clinical Pharmacy Team for diabetes management.     Referring Provider: Christopher D'Amico, DO     HISTORY OF PRESENT ILLNESS  Approximate Date of Diagnosis: 2+ years ago   Known complications due to diabetes included obesity; BMI of 37.59    Diet:   - Patient works second shift, she has 1-2 meals per day and overall eats relatively healthy  - She meal preps dinners for the week     LAB REVIEW   Glucose (mg/dL)   Date Value   04/22/2024 138 (H)   10/16/2023 123 (H)   04/17/2023 127 (H)   10/10/2022 133 (H)   04/11/2022 114 (H)     Hemoglobin A1C (%)   Date Value   04/22/2024 6.7 (H)   10/16/2023 6.9 (H)   04/17/2023 6.7 (A)   10/10/2022 6.3 (A)   04/11/2022 6.2 (A)     Bicarbonate (mmol/L)   Date Value   04/22/2024 28   10/16/2023 27   04/17/2023 27   10/10/2022 27   04/11/2022 23     Urea Nitrogen (mg/dL)   Date Value   04/22/2024 10   10/16/2023 10   04/17/2023 9   10/10/2022 9   04/11/2022 10     Creatinine (mg/dL)   Date Value   04/22/2024 0.58   10/16/2023 0.58   04/17/2023 0.54   10/10/2022 0.53   04/11/2022 0.60     Lab Results   Component Value Date    HGBA1C 6.7 (H) 04/22/2024    HGBA1C 6.9 (H) 10/16/2023    HGBA1C 6.7 (A) 04/17/2023     Lab Results   Component Value Date    CHOL 85 04/22/2024    CHOL 76 10/16/2023    CHOL 75 04/17/2023     Lab Results   Component Value Date    HDL 35.3 04/22/2024    HDL 35.7 10/16/2023    HDL 32.7 (A) 04/17/2023     Lab Results   Component Value Date    LDLCALC 25 04/22/2024    LDLCALC 15 10/16/2023     Lab Results   Component Value Date    TRIG 122 04/22/2024    TRIG 127 10/16/2023    TRIG 99 04/17/2023       DIABETES ASSESSMENT    CURRENT PHARMACOTHERAPY  - metformin  mg once daily (higher doses causes GI upset)   - farxiga 10 mg once daily   - rybelsus 3 mg once daily    SECONDARY PREVENTION  - Statin? Yes  - ACE-I/ARB? Yes    GLP1 Screening:   - pancreatitis: no   -  MTC/MEN2: no     SMBG MEASUREMENTS: patient does not test    DISCUSSION:  Patient started rybelsus 3 weeks ago:   She is tolerating the medication, she notes mild constipation but denies any other GI upset   Counseled we will stay on the current dose for 1 more month in order to see if this constipation subsides   Counseled on miralax or metamucil to increase daily fiber   Answered all questions and concerns   Patient inquired about stopping metformin, discussed at this time, we will continue on metformin, once we start to increase rybelsus we can re-evaluate     RECOMMENDATIONS/PLAN  1. Patients diabetes is stable with most recent A1c of 6.7 % (goal < 7 %).   - Continue all meds under the continuation of care with the referring provider and clinical pharmacy team.    Clinical Pharmacist follow up: 4 weeks    Thank you,  Hien Silva, PharmD    Verbal consent to manage patient's drug therapy was obtained from the patient. They were informed they may decline to participate or withdraw from participation in pharmacy services at any time.

## 2024-06-27 DIAGNOSIS — I10 ESSENTIAL (PRIMARY) HYPERTENSION: ICD-10-CM

## 2024-06-27 RX ORDER — AMLODIPINE AND OLMESARTAN MEDOXOMIL 5; 20 MG/1; MG/1
1 TABLET ORAL DAILY
Qty: 90 TABLET | Refills: 1 | Status: SHIPPED | OUTPATIENT
Start: 2024-06-27

## 2024-07-11 ENCOUNTER — APPOINTMENT (OUTPATIENT)
Dept: PHARMACY | Facility: HOSPITAL | Age: 59
End: 2024-07-11
Payer: COMMERCIAL

## 2024-07-11 DIAGNOSIS — E11.9 CONTROLLED TYPE 2 DIABETES MELLITUS WITHOUT COMPLICATION, WITHOUT LONG-TERM CURRENT USE OF INSULIN (MULTI): Primary | ICD-10-CM

## 2024-07-11 NOTE — PROGRESS NOTES
Pharmacist Clinic: Diabetes Management  Thea Marcial is a 58 y.o. female was referred to Clinical Pharmacy Team for diabetes management.     Referring Provider: Christopher D'Amico, DO     HISTORY OF PRESENT ILLNESS  Approximate Date of Diagnosis: 2+ years ago   Known complications due to diabetes included obesity; BMI of 37.59    Diet:   - Patient works second shift, she has 1-2 meals per day and overall eats relatively healthy  - She meal preps dinners for the week     LAB REVIEW   Glucose (mg/dL)   Date Value   04/22/2024 138 (H)   10/16/2023 123 (H)   04/17/2023 127 (H)   10/10/2022 133 (H)   04/11/2022 114 (H)     Hemoglobin A1C (%)   Date Value   04/22/2024 6.7 (H)   10/16/2023 6.9 (H)   04/17/2023 6.7 (A)   10/10/2022 6.3 (A)   04/11/2022 6.2 (A)     Bicarbonate (mmol/L)   Date Value   04/22/2024 28   10/16/2023 27   04/17/2023 27   10/10/2022 27   04/11/2022 23     Urea Nitrogen (mg/dL)   Date Value   04/22/2024 10   10/16/2023 10   04/17/2023 9   10/10/2022 9   04/11/2022 10     Creatinine (mg/dL)   Date Value   04/22/2024 0.58   10/16/2023 0.58   04/17/2023 0.54   10/10/2022 0.53   04/11/2022 0.60     Lab Results   Component Value Date    HGBA1C 6.7 (H) 04/22/2024    HGBA1C 6.9 (H) 10/16/2023    HGBA1C 6.7 (A) 04/17/2023     Lab Results   Component Value Date    CHOL 85 04/22/2024    CHOL 76 10/16/2023    CHOL 75 04/17/2023     Lab Results   Component Value Date    HDL 35.3 04/22/2024    HDL 35.7 10/16/2023    HDL 32.7 (A) 04/17/2023     Lab Results   Component Value Date    LDLCALC 25 04/22/2024    LDLCALC 15 10/16/2023     Lab Results   Component Value Date    TRIG 122 04/22/2024    TRIG 127 10/16/2023    TRIG 99 04/17/2023       DIABETES ASSESSMENT    CURRENT PHARMACOTHERAPY  - metformin  mg once daily (higher doses causes GI upset)   - farxiga 10 mg once daily   - rybelsus 3 mg once daily    SECONDARY PREVENTION  - Statin? Yes  - ACE-I/ARB? Yes    GLP1 Screening:   - pancreatitis: no   -  MTC/MEN2: no     SMBG MEASUREMENTS: patient does not test    DISCUSSION:  Patient is tolerating rybelsus 3 mg:   She denies any side effects at this time   She reports she is eating less, she is not sure if she has lost any weight   Discussed we will increase to rybelsus 7 mg once daily   Counseled patient, answered all questions and concerns   Answered all questions and concerns   Patient inquired about stopping metformin, discussed at this time, we will continue on metformin, once we start to increase rybelsus we can re-evaluate     RECOMMENDATIONS/PLAN  1. Patients diabetes is stable with most recent A1c of 6.7 % (goal < 7 %).   - Continue all meds under the continuation of care with the referring provider and clinical pharmacy team.  - Increase rybelsus to 7 mg once daily.    Clinical Pharmacist follow up: 4 weeks, if patient is tolerating rybelsus 7 mg we can discuss discontinuing metformin     Thank you,  Hien Silva, PharmD    Verbal consent to manage patient's drug therapy was obtained from the patient. They were informed they may decline to participate or withdraw from participation in pharmacy services at any time.

## 2024-08-05 ENCOUNTER — APPOINTMENT (OUTPATIENT)
Dept: OBSTETRICS AND GYNECOLOGY | Facility: CLINIC | Age: 59
End: 2024-08-05
Payer: COMMERCIAL

## 2024-08-05 VITALS
BODY MASS INDEX: 37.54 KG/M2 | DIASTOLIC BLOOD PRESSURE: 72 MMHG | SYSTOLIC BLOOD PRESSURE: 152 MMHG | WEIGHT: 239.2 LBS | HEIGHT: 67 IN

## 2024-08-05 DIAGNOSIS — Z01.419 WELL WOMAN EXAM: ICD-10-CM

## 2024-08-05 DIAGNOSIS — Z12.31 ENCOUNTER FOR SCREENING MAMMOGRAM FOR MALIGNANT NEOPLASM OF BREAST: Primary | ICD-10-CM

## 2024-08-05 DIAGNOSIS — Z12.4 SCREENING FOR CERVICAL CANCER: ICD-10-CM

## 2024-08-05 PROCEDURE — 88175 CYTOPATH C/V AUTO FLUID REDO: CPT

## 2024-08-05 PROCEDURE — 87624 HPV HI-RISK TYP POOLED RSLT: CPT

## 2024-08-05 PROCEDURE — 3044F HG A1C LEVEL LT 7.0%: CPT | Performed by: ADVANCED PRACTICE MIDWIFE

## 2024-08-05 PROCEDURE — 3078F DIAST BP <80 MM HG: CPT | Performed by: ADVANCED PRACTICE MIDWIFE

## 2024-08-05 PROCEDURE — 3077F SYST BP >= 140 MM HG: CPT | Performed by: ADVANCED PRACTICE MIDWIFE

## 2024-08-05 PROCEDURE — 1036F TOBACCO NON-USER: CPT | Performed by: ADVANCED PRACTICE MIDWIFE

## 2024-08-05 PROCEDURE — 99396 PREV VISIT EST AGE 40-64: CPT | Performed by: ADVANCED PRACTICE MIDWIFE

## 2024-08-05 PROCEDURE — 3048F LDL-C <100 MG/DL: CPT | Performed by: ADVANCED PRACTICE MIDWIFE

## 2024-08-05 PROCEDURE — 3008F BODY MASS INDEX DOCD: CPT | Performed by: ADVANCED PRACTICE MIDWIFE

## 2024-08-05 ASSESSMENT — PAIN SCALES - GENERAL: PAINLEVEL: 0-NO PAIN

## 2024-08-05 NOTE — PROGRESS NOTES
Assessment/Plan   Problem List Items Addressed This Visit    None     Problem List Items Addressed This Visit       Well woman exam    Overview     History  Age of menarche: 12  Last pap: 2019 neg/neg sent today 2024  History of abnormal: No  Mammogram: 2023  History of abnormal: remote history x 1 - no biopsies      Sexual Health  Active with: Male  Pain: No  Lubrication: No  Orgasm: No  Experienced sexual choking in past year: No    Family Cancer History  Breast: No  Ovarian: No  Endometrial: No  Colon: No    Colon cancer screening:  Cologuard done 2022 - due 2025  Calcium CT Scoring: Done 2019, score 0    Safety  Feels safe in home: No  Has been forced in sexual activity in last year: No               Other Visit Diagnoses       Encounter for screening mammogram for malignant neoplasm of breast    -  Primary    Relevant Orders    BI mammo bilateral screening tomosynthesis    Screening for cervical cancer        Relevant Orders    THINPREP PAP TEST (>30)             Karol Soria, JENARO-CNM     Subjective   The patient is here for a well woman exam.  She is postmenopausal and denies any PMB  She has been menopausal (natural) menopause for 1 years    She reports vasomotor symptoms: no - runs warm  She reports GSM: no  She is sexually active:  yes -    She reports her sexually activity to include penis in vagina yes -      Do you feel that you manage your stress level well:  yes - somedays  Do you feel you get at least 6 hours of sleep per night:  no - sinus issues  Do you feel like you eat a balanced diet:  yes -          Review of Systems    Objective :  see vital signs and BMI      General:   Alert and oriented x 3   Heart:  Thyroid: Regular rate, rhythm  Euthyroid, normal shape and size   Lungs:  Breast: Clear to auscultation bilaterally  Symmetrical, no skin changes/nipple discharge, redness, tenderness, no masses palpated bilaterally   Abdomen: Soft, non tender   Vulva: EGBUS normal   Vagina: Atrophic,  normal discharge   Cervix: No CMT   Uterus: Normal shape, size   Adnexa: NT bilaterally       Thank you for coming to see me for your visit today and most importantly thank you for having a preventative visit.  If lab work was sent, you will see your test result via [employer]  Any prescriptions will be sent electronically to your pharmacy listed    I look forward to seeing you next year for your health care needs.  Please remember:   Sleep is important!   Exercise such as walking for 20 minutes per day is beneficial to your physical and mental health   Sleep is so important and should be a priority!   Healthy diets can be expensive -- if you every feel like you are struggling to afford healthy food, please let me know as we have a very good program called [employer] that is available to you   Decrease alcohol and tobacco    Be well!  Leola

## 2024-08-07 ENCOUNTER — APPOINTMENT (OUTPATIENT)
Dept: PHARMACY | Facility: HOSPITAL | Age: 59
End: 2024-08-07
Payer: COMMERCIAL

## 2024-08-07 DIAGNOSIS — E11.9 CONTROLLED TYPE 2 DIABETES MELLITUS WITHOUT COMPLICATION, WITHOUT LONG-TERM CURRENT USE OF INSULIN (MULTI): ICD-10-CM

## 2024-08-07 NOTE — PROGRESS NOTES
Pharmacist Clinic: Diabetes Management  Thea Marcial is a 58 y.o. female was referred to Clinical Pharmacy Team for diabetes management.     Referring Provider: Christopher D'Amico, DO     HISTORY OF PRESENT ILLNESS  Approximate Date of Diagnosis: 2+ years ago   Known complications due to diabetes included obesity; BMI of 37.59    Diet:   - Patient works second shift, she has 1-2 meals per day and overall eats relatively healthy  - She meal preps dinners for the week     LAB REVIEW   Glucose (mg/dL)   Date Value   04/22/2024 138 (H)   10/16/2023 123 (H)   04/17/2023 127 (H)   10/10/2022 133 (H)   04/11/2022 114 (H)     Hemoglobin A1C (%)   Date Value   04/22/2024 6.7 (H)   10/16/2023 6.9 (H)   04/17/2023 6.7 (A)   10/10/2022 6.3 (A)   04/11/2022 6.2 (A)     Bicarbonate (mmol/L)   Date Value   04/22/2024 28   10/16/2023 27   04/17/2023 27   10/10/2022 27   04/11/2022 23     Urea Nitrogen (mg/dL)   Date Value   04/22/2024 10   10/16/2023 10   04/17/2023 9   10/10/2022 9   04/11/2022 10     Creatinine (mg/dL)   Date Value   04/22/2024 0.58   10/16/2023 0.58   04/17/2023 0.54   10/10/2022 0.53   04/11/2022 0.60     Lab Results   Component Value Date    HGBA1C 6.7 (H) 04/22/2024    HGBA1C 6.9 (H) 10/16/2023    HGBA1C 6.7 (A) 04/17/2023     Lab Results   Component Value Date    CHOL 85 04/22/2024    CHOL 76 10/16/2023    CHOL 75 04/17/2023     Lab Results   Component Value Date    HDL 35.3 04/22/2024    HDL 35.7 10/16/2023    HDL 32.7 (A) 04/17/2023     Lab Results   Component Value Date    LDLCALC 25 04/22/2024    LDLCALC 15 10/16/2023     Lab Results   Component Value Date    TRIG 122 04/22/2024    TRIG 127 10/16/2023    TRIG 99 04/17/2023       DIABETES ASSESSMENT    CURRENT PHARMACOTHERAPY  - metformin  mg once daily (higher doses causes GI upset)   - farxiga 10 mg once daily   - rybelsus 7 mg once daily    SECONDARY PREVENTION  - Statin? Yes  - ACE-I/ARB? Yes    GLP1 Screening:   - pancreatitis: no   -  MTC/MEN2: no     SMBG MEASUREMENTS: patient does not test    DISCUSSION:  Patient is tolerating rybelsus 7 mg:   She reports mild heartburn, but has only had 2 episodes in the last month  She reports she is eating less, but she has not lost any weight  Discussed continuing on this dose for 3 more months and we will reassess need for dose adjustment based on cardiometabolic benefit   Answered all questions and concerns     RECOMMENDATIONS/PLAN  1. Patients diabetes is stable with most recent A1c of 6.7 % (goal < 7 %).   - Continue all meds under the continuation of care with the referring provider and clinical pharmacy team.    Clinical Pharmacist follow up: 3 months     Thank you,  Hien Silva, PharmD    Verbal consent to manage patient's drug therapy was obtained from the patient. They were informed they may decline to participate or withdraw from participation in pharmacy services at any time.

## 2024-08-23 DIAGNOSIS — E55.9 VITAMIN D DEFICIENCY, UNSPECIFIED: ICD-10-CM

## 2024-08-26 RX ORDER — ASPIRIN 325 MG
TABLET, DELAYED RELEASE (ENTERIC COATED) ORAL
Qty: 12 CAPSULE | Refills: 1 | Status: SHIPPED | OUTPATIENT
Start: 2024-08-26

## 2024-09-23 DIAGNOSIS — G35 MULTIPLE SCLEROSIS (MULTI): ICD-10-CM

## 2024-09-24 DIAGNOSIS — E78.1 PURE HYPERGLYCERIDEMIA: ICD-10-CM

## 2024-09-24 DIAGNOSIS — E11.9 TYPE 2 DIABETES MELLITUS WITHOUT COMPLICATIONS (MULTI): ICD-10-CM

## 2024-09-24 RX ORDER — METFORMIN HYDROCHLORIDE 500 MG/1
500 TABLET, EXTENDED RELEASE ORAL
Qty: 90 TABLET | Refills: 1 | Status: SHIPPED | OUTPATIENT
Start: 2024-09-24

## 2024-09-24 RX ORDER — ROSUVASTATIN CALCIUM 20 MG/1
20 TABLET, COATED ORAL DAILY
Qty: 90 TABLET | Refills: 1 | Status: SHIPPED | OUTPATIENT
Start: 2024-09-24

## 2024-09-25 RX ORDER — GLATIRAMER 40 MG/ML
INJECTION, SOLUTION SUBCUTANEOUS
Qty: 12 ML | Refills: 5 | Status: SHIPPED | OUTPATIENT
Start: 2024-09-25

## 2024-10-01 ENCOUNTER — HOSPITAL ENCOUNTER (OUTPATIENT)
Dept: RADIOLOGY | Facility: CLINIC | Age: 59
Discharge: HOME | End: 2024-10-01
Payer: COMMERCIAL

## 2024-10-01 VITALS — WEIGHT: 239 LBS | BODY MASS INDEX: 37.43 KG/M2

## 2024-10-01 DIAGNOSIS — Z12.31 ENCOUNTER FOR SCREENING MAMMOGRAM FOR MALIGNANT NEOPLASM OF BREAST: ICD-10-CM

## 2024-10-01 DIAGNOSIS — E11.9 TYPE 2 DIABETES MELLITUS WITHOUT COMPLICATIONS (MULTI): ICD-10-CM

## 2024-10-01 PROCEDURE — 77067 SCR MAMMO BI INCL CAD: CPT

## 2024-10-01 RX ORDER — DAPAGLIFLOZIN 10 MG/1
10 TABLET, FILM COATED ORAL
Qty: 90 TABLET | Refills: 1 | Status: SHIPPED | OUTPATIENT
Start: 2024-10-01

## 2024-10-15 DIAGNOSIS — G35 MULTIPLE SCLEROSIS (MULTI): ICD-10-CM

## 2024-10-15 DIAGNOSIS — E11.9 CONTROLLED TYPE 2 DIABETES MELLITUS WITHOUT COMPLICATION, WITHOUT LONG-TERM CURRENT USE OF INSULIN (MULTI): Primary | ICD-10-CM

## 2024-10-15 DIAGNOSIS — E78.1 FAMILIAL HYPERTRIGLYCERIDEMIA: ICD-10-CM

## 2024-10-15 DIAGNOSIS — E78.6 LOW HDL (UNDER 40): ICD-10-CM

## 2024-10-15 DIAGNOSIS — I10 BENIGN ESSENTIAL HYPERTENSION: ICD-10-CM

## 2024-10-15 DIAGNOSIS — E78.00 ELEVATED LDL CHOLESTEROL LEVEL: ICD-10-CM

## 2024-10-21 ENCOUNTER — LAB (OUTPATIENT)
Dept: LAB | Facility: LAB | Age: 59
End: 2024-10-21
Payer: COMMERCIAL

## 2024-10-21 DIAGNOSIS — G35 MULTIPLE SCLEROSIS (MULTI): ICD-10-CM

## 2024-10-21 DIAGNOSIS — E78.1 FAMILIAL HYPERTRIGLYCERIDEMIA: ICD-10-CM

## 2024-10-21 DIAGNOSIS — E11.9 CONTROLLED TYPE 2 DIABETES MELLITUS WITHOUT COMPLICATION, WITHOUT LONG-TERM CURRENT USE OF INSULIN (MULTI): ICD-10-CM

## 2024-10-21 DIAGNOSIS — E78.00 ELEVATED LDL CHOLESTEROL LEVEL: ICD-10-CM

## 2024-10-21 DIAGNOSIS — I10 BENIGN ESSENTIAL HYPERTENSION: ICD-10-CM

## 2024-10-21 DIAGNOSIS — E78.6 LOW HDL (UNDER 40): ICD-10-CM

## 2024-10-21 LAB
ALBUMIN SERPL BCP-MCNC: 4.4 G/DL (ref 3.4–5)
ALP SERPL-CCNC: 58 U/L (ref 33–110)
ALT SERPL W P-5'-P-CCNC: 29 U/L (ref 7–45)
ANION GAP SERPL CALC-SCNC: 15 MMOL/L (ref 10–20)
AST SERPL W P-5'-P-CCNC: 22 U/L (ref 9–39)
BILIRUB SERPL-MCNC: 0.7 MG/DL (ref 0–1.2)
BUN SERPL-MCNC: 12 MG/DL (ref 6–23)
CALCIUM SERPL-MCNC: 9.3 MG/DL (ref 8.6–10.6)
CHLORIDE SERPL-SCNC: 102 MMOL/L (ref 98–107)
CHOLEST SERPL-MCNC: 88 MG/DL (ref 0–199)
CHOLESTEROL/HDL RATIO: 2.7
CO2 SERPL-SCNC: 27 MMOL/L (ref 21–32)
CREAT SERPL-MCNC: 0.7 MG/DL (ref 0.5–1.05)
CREAT UR-MCNC: 108.4 MG/DL (ref 20–320)
EGFRCR SERPLBLD CKD-EPI 2021: >90 ML/MIN/1.73M*2
ERYTHROCYTE [DISTWIDTH] IN BLOOD BY AUTOMATED COUNT: 12.4 % (ref 11.5–14.5)
EST. AVERAGE GLUCOSE BLD GHB EST-MCNC: 120 MG/DL
GLUCOSE SERPL-MCNC: 134 MG/DL (ref 74–99)
HBA1C MFR BLD: 5.8 %
HCT VFR BLD AUTO: 45.1 % (ref 36–46)
HDLC SERPL-MCNC: 32.7 MG/DL
HGB BLD-MCNC: 14.9 G/DL (ref 12–16)
LDLC SERPL CALC-MCNC: 33 MG/DL
MCH RBC QN AUTO: 29.2 PG (ref 26–34)
MCHC RBC AUTO-ENTMCNC: 33 G/DL (ref 32–36)
MCV RBC AUTO: 88 FL (ref 80–100)
MICROALBUMIN UR-MCNC: <7 MG/L
MICROALBUMIN/CREAT UR: NORMAL MG/G{CREAT}
NON HDL CHOLESTEROL: 55 MG/DL (ref 0–149)
NRBC BLD-RTO: 0 /100 WBCS (ref 0–0)
PLATELET # BLD AUTO: 330 X10*3/UL (ref 150–450)
POTASSIUM SERPL-SCNC: 4.4 MMOL/L (ref 3.5–5.3)
PROT SERPL-MCNC: 7.1 G/DL (ref 6.4–8.2)
RBC # BLD AUTO: 5.11 X10*6/UL (ref 4–5.2)
SODIUM SERPL-SCNC: 140 MMOL/L (ref 136–145)
TRIGL SERPL-MCNC: 111 MG/DL (ref 0–149)
VLDL: 22 MG/DL (ref 0–40)
WBC # BLD AUTO: 10.9 X10*3/UL (ref 4.4–11.3)

## 2024-10-21 PROCEDURE — 80053 COMPREHEN METABOLIC PANEL: CPT

## 2024-10-21 PROCEDURE — 85027 COMPLETE CBC AUTOMATED: CPT

## 2024-10-21 PROCEDURE — 36415 COLL VENOUS BLD VENIPUNCTURE: CPT

## 2024-10-21 PROCEDURE — 80061 LIPID PANEL: CPT

## 2024-10-21 PROCEDURE — 82043 UR ALBUMIN QUANTITATIVE: CPT

## 2024-10-21 PROCEDURE — 82570 ASSAY OF URINE CREATININE: CPT

## 2024-10-21 PROCEDURE — 83036 HEMOGLOBIN GLYCOSYLATED A1C: CPT

## 2024-10-21 ASSESSMENT — PROMIS GLOBAL HEALTH SCALE
CARRYOUT_SOCIAL_ACTIVITIES: VERY GOOD
RATE_SOCIAL_SATISFACTION: VERY GOOD
RATE_GENERAL_HEALTH: VERY GOOD
RATE_MENTAL_HEALTH: VERY GOOD
RATE_PHYSICAL_HEALTH: VERY GOOD
CARRYOUT_PHYSICAL_ACTIVITIES: COMPLETELY
RATE_AVERAGE_PAIN: 0
RATE_QUALITY_OF_LIFE: VERY GOOD
EMOTIONAL_PROBLEMS: NEVER

## 2024-10-22 ENCOUNTER — TELEMEDICINE (OUTPATIENT)
Dept: PHARMACY | Facility: HOSPITAL | Age: 59
End: 2024-10-22
Payer: COMMERCIAL

## 2024-10-22 DIAGNOSIS — E11.9 CONTROLLED TYPE 2 DIABETES MELLITUS WITHOUT COMPLICATION, WITHOUT LONG-TERM CURRENT USE OF INSULIN (MULTI): Primary | ICD-10-CM

## 2024-10-22 NOTE — PROGRESS NOTES
Pharmacist Clinic: Diabetes Management  Thea Marcial is a 59 y.o. female was referred to Clinical Pharmacy Team for diabetes management.     Referring Provider: Christopher D'Amico, DO     HISTORY OF PRESENT ILLNESS  Approximate Date of Diagnosis: 2+ years ago   Known complications due to diabetes included obesity; BMI of 37.59    Diet:   - Patient works second shift, she has 1-2 meals per day and overall eats relatively healthy  - She meal preps dinners for the week     LAB REVIEW   Glucose (mg/dL)   Date Value   10/21/2024 134 (H)   04/22/2024 138 (H)   10/16/2023 123 (H)     Hemoglobin A1C (%)   Date Value   10/21/2024 5.8 (H)   04/22/2024 6.7 (H)   10/16/2023 6.9 (H)     Bicarbonate (mmol/L)   Date Value   10/21/2024 27   04/22/2024 28   10/16/2023 27     Urea Nitrogen (mg/dL)   Date Value   10/21/2024 12   04/22/2024 10   10/16/2023 10     Creatinine (mg/dL)   Date Value   10/21/2024 0.70   04/22/2024 0.58   10/16/2023 0.58     Lab Results   Component Value Date    HGBA1C 5.8 (H) 10/21/2024    HGBA1C 6.7 (H) 04/22/2024    HGBA1C 6.9 (H) 10/16/2023     Lab Results   Component Value Date    CHOL 88 10/21/2024    CHOL 85 04/22/2024    CHOL 76 10/16/2023     Lab Results   Component Value Date    HDL 32.7 10/21/2024    HDL 35.3 04/22/2024    HDL 35.7 10/16/2023     Lab Results   Component Value Date    LDLCALC 33 10/21/2024    LDLCALC 25 04/22/2024    LDLCALC 15 10/16/2023     Lab Results   Component Value Date    TRIG 111 10/21/2024    TRIG 122 04/22/2024    TRIG 127 10/16/2023       DIABETES ASSESSMENT    CURRENT PHARMACOTHERAPY  - metformin  mg once daily (higher doses causes GI upset)   - farxiga 10 mg once daily   - rybelsus 7 mg once daily    SECONDARY PREVENTION  - Statin? Yes  - ACE-I/ARB? Yes    GLP1 Screening:   - pancreatitis: no   - MTC/MEN2: no     SMBG MEASUREMENTS: patient does not test    DISCUSSION:  Patients A1c came back at 5.8%, this is well below goal of 7%, congrats!   Patient is  tolerating current medication regimen:   Metformin: patient is inquiring about discontinuing the medication due to pill burden   Discussed discontinuing is appropriate   Rybelsus 7 mg:   Patient is reporting sulfur burps   Discussed OTC peppermint supplementation for alleviation of these burps and food tracking to help identify triggers   Farxiga: patient is tolerating the medication without issues     RECOMMENDATIONS/PLAN  1. Patients diabetes is well controlled with most recent A1c of 5.8 % (goal < 7 %).   - Continue all meds under the continuation of care with the referring provider and clinical pharmacy team.  - Discontinue metformin.     Clinical Pharmacist follow up: 1 month    Thank you,  Hien Silva, PharmD    Verbal consent to manage patient's drug therapy was obtained from the patient. They were informed they may decline to participate or withdraw from participation in pharmacy services at any time.

## 2024-10-23 ENCOUNTER — APPOINTMENT (OUTPATIENT)
Dept: PRIMARY CARE | Facility: CLINIC | Age: 59
End: 2024-10-23
Payer: COMMERCIAL

## 2024-10-23 VITALS
HEIGHT: 67 IN | HEART RATE: 82 BPM | BODY MASS INDEX: 35.63 KG/M2 | DIASTOLIC BLOOD PRESSURE: 80 MMHG | SYSTOLIC BLOOD PRESSURE: 129 MMHG | WEIGHT: 227 LBS | OXYGEN SATURATION: 96 %

## 2024-10-23 DIAGNOSIS — E78.00 ELEVATED LDL CHOLESTEROL LEVEL: ICD-10-CM

## 2024-10-23 DIAGNOSIS — E55.9 VITAMIN D DEFICIENCY: ICD-10-CM

## 2024-10-23 DIAGNOSIS — E78.1 FAMILIAL HYPERTRIGLYCERIDEMIA: ICD-10-CM

## 2024-10-23 DIAGNOSIS — G35 MULTIPLE SCLEROSIS (MULTI): ICD-10-CM

## 2024-10-23 DIAGNOSIS — E78.6 LOW HDL (UNDER 40): ICD-10-CM

## 2024-10-23 DIAGNOSIS — I10 BENIGN ESSENTIAL HYPERTENSION: Primary | ICD-10-CM

## 2024-10-23 DIAGNOSIS — E11.9 CONTROLLED TYPE 2 DIABETES MELLITUS WITHOUT COMPLICATION, WITHOUT LONG-TERM CURRENT USE OF INSULIN (MULTI): ICD-10-CM

## 2024-10-23 DIAGNOSIS — Z00.00 WELLNESS EXAMINATION: ICD-10-CM

## 2024-10-23 PROCEDURE — 99396 PREV VISIT EST AGE 40-64: CPT | Performed by: STUDENT IN AN ORGANIZED HEALTH CARE EDUCATION/TRAINING PROGRAM

## 2024-10-23 PROCEDURE — 3062F POS MACROALBUMINURIA REV: CPT | Performed by: STUDENT IN AN ORGANIZED HEALTH CARE EDUCATION/TRAINING PROGRAM

## 2024-10-23 PROCEDURE — 3079F DIAST BP 80-89 MM HG: CPT | Performed by: STUDENT IN AN ORGANIZED HEALTH CARE EDUCATION/TRAINING PROGRAM

## 2024-10-23 PROCEDURE — 3008F BODY MASS INDEX DOCD: CPT | Performed by: STUDENT IN AN ORGANIZED HEALTH CARE EDUCATION/TRAINING PROGRAM

## 2024-10-23 PROCEDURE — 3048F LDL-C <100 MG/DL: CPT | Performed by: STUDENT IN AN ORGANIZED HEALTH CARE EDUCATION/TRAINING PROGRAM

## 2024-10-23 PROCEDURE — 1036F TOBACCO NON-USER: CPT | Performed by: STUDENT IN AN ORGANIZED HEALTH CARE EDUCATION/TRAINING PROGRAM

## 2024-10-23 PROCEDURE — 3044F HG A1C LEVEL LT 7.0%: CPT | Performed by: STUDENT IN AN ORGANIZED HEALTH CARE EDUCATION/TRAINING PROGRAM

## 2024-10-23 PROCEDURE — 99214 OFFICE O/P EST MOD 30 MIN: CPT | Performed by: STUDENT IN AN ORGANIZED HEALTH CARE EDUCATION/TRAINING PROGRAM

## 2024-10-23 PROCEDURE — 3074F SYST BP LT 130 MM HG: CPT | Performed by: STUDENT IN AN ORGANIZED HEALTH CARE EDUCATION/TRAINING PROGRAM

## 2024-10-23 ASSESSMENT — COLUMBIA-SUICIDE SEVERITY RATING SCALE - C-SSRS
2. HAVE YOU ACTUALLY HAD ANY THOUGHTS OF KILLING YOURSELF?: NO
6. HAVE YOU EVER DONE ANYTHING, STARTED TO DO ANYTHING, OR PREPARED TO DO ANYTHING TO END YOUR LIFE?: NO
1. IN THE PAST MONTH, HAVE YOU WISHED YOU WERE DEAD OR WISHED YOU COULD GO TO SLEEP AND NOT WAKE UP?: NO

## 2024-10-23 ASSESSMENT — ENCOUNTER SYMPTOMS
OCCASIONAL FEELINGS OF UNSTEADINESS: 0
DEPRESSION: 0
LOSS OF SENSATION IN FEET: 0

## 2024-10-23 NOTE — PROGRESS NOTES
59-year-old female presenting for follow-up on chronic concerns, CPE.    HTN  Stable, tolerating current regimen well.     DMII  Stable, tolerating current regimen well.  Occasional belching, occasional constipation since starting Rybelsus.  Has lost over 10 pounds.     Low HDL, hypertriglyceridemia  Stable, tolerating statin and Vascepa, continued low HDL.     Vitamin D deficiency  Stable, tolerating current regimen well.     MS  Stable, doing very well on current regimen.    SocHx:   - Smoking: Never    12 point ROS reviewed and negative other than as stated in HPI      General: Alert, oriented, pleasant, in no acute distress  HEENT:      Head: normocephalic, atraumatic;      eyes: EOMI, no scleral icterus;   Neck: soft, supple, non-tender, no masses appreciated  CV: Heart with regular rate and rhythm, normal S1/S2, no murmurs  Lungs: CTAB without wheezing, rhonchi or rales; good respiratory effort, no increased work of breathing  Abdomen: soft, non-tender, non-distended, no masses appreciated  Extremities: No edema bilaterally, no cyanosis  Neuro: Cranial nerves grossly intact; alert and oriented, normal gait  Psych: Appropriate mood and affect    #HM  - Labs done before visit, reviewed with patient  -Vaccines:       Flu: Recommended, declines      Shingrix: Recommended, declines at this time      Pneumococcal: Recommended, declines at this time      Tdap: Recommended, declines at this time  -Yazan w/ desiree: UTD, further imaging pending  -Last PAP: Follow-up with GYN  -Lung cancer screening with low-dose CT: Never smoker  -Colonoscopy: Cologuard negative 2022  -Osteoporosis screening: At 66    #HTN  -At goal in office  -Continue amlodipine-olmesartan 5-20 mg daily     #DMII  -Last A1C: 5.8, 10/2024  -Current Medications: Farxiga 10 mg daily, metformin  mg daily (highest tolerated dose), Rybelsus 7 mg daily  -Ophthalmology UTD  - Diabetic foot exam negative 10/2024  -Statin: rosuvastatin 20 mg daily  -ASA: 81  mg daily  -ACE/ARB: Olmesartan 20 mg daily     #Low HDL #Hypertriglyceridemia  -HDL continuously below goal, recommend increased exercise  -Continue rosuvastatin 20 mg daily  - Triglycerides at goal, continue Vascepa     #Vitamin D deficiency  - Continue with 50,000 units monthly     #MS  -Stable  - Continue to follow with neurology    F/U 6 months or sooner if indicated    Chris D'Amico, DO

## 2024-10-24 ENCOUNTER — APPOINTMENT (OUTPATIENT)
Dept: PHARMACY | Facility: HOSPITAL | Age: 59
End: 2024-10-24
Payer: COMMERCIAL

## 2024-10-29 ENCOUNTER — HOSPITAL ENCOUNTER (OUTPATIENT)
Dept: RADIOLOGY | Facility: HOSPITAL | Age: 59
Discharge: HOME | End: 2024-10-29
Payer: COMMERCIAL

## 2024-10-29 VITALS — HEIGHT: 67 IN | WEIGHT: 227 LBS | BODY MASS INDEX: 35.63 KG/M2

## 2024-10-29 DIAGNOSIS — R92.8 OTHER ABNORMAL AND INCONCLUSIVE FINDINGS ON DIAGNOSTIC IMAGING OF BREAST: ICD-10-CM

## 2024-10-29 DIAGNOSIS — N60.02 BREAST CYST, LEFT: Primary | ICD-10-CM

## 2024-10-29 PROCEDURE — 77061 BREAST TOMOSYNTHESIS UNI: CPT | Mod: LT

## 2024-10-29 PROCEDURE — 77061 BREAST TOMOSYNTHESIS UNI: CPT | Mod: LEFT SIDE | Performed by: RADIOLOGY

## 2024-10-29 PROCEDURE — 77065 DX MAMMO INCL CAD UNI: CPT | Mod: LEFT SIDE | Performed by: RADIOLOGY

## 2024-11-05 ENCOUNTER — APPOINTMENT (OUTPATIENT)
Dept: PHARMACY | Facility: HOSPITAL | Age: 59
End: 2024-11-05
Payer: COMMERCIAL

## 2024-11-11 DIAGNOSIS — E78.1 PURE HYPERGLYCERIDEMIA: ICD-10-CM

## 2024-11-11 RX ORDER — ICOSAPENT ETHYL 1 G/1
CAPSULE ORAL
Qty: 360 CAPSULE | Refills: 1 | Status: SHIPPED | OUTPATIENT
Start: 2024-11-11

## 2024-11-21 ENCOUNTER — APPOINTMENT (OUTPATIENT)
Dept: PHARMACY | Facility: HOSPITAL | Age: 59
End: 2024-11-21
Payer: COMMERCIAL

## 2024-11-21 DIAGNOSIS — E11.9 CONTROLLED TYPE 2 DIABETES MELLITUS WITHOUT COMPLICATION, WITHOUT LONG-TERM CURRENT USE OF INSULIN (MULTI): Primary | ICD-10-CM

## 2024-11-21 NOTE — PROGRESS NOTES
Pharmacist Clinic: Diabetes Management  Thea Marcial is a 59 y.o. female was referred to Clinical Pharmacy Team for diabetes management.     Referring Provider: Christopher D'Amico, DO     HISTORY OF PRESENT ILLNESS  Approximate Date of Diagnosis: 2+ years ago   Known complications due to diabetes included obesity; BMI of 37.59    Diet:   - Patient works second shift, she has 1-2 meals per day and overall eats relatively healthy  - She meal preps dinners for the week     LAB REVIEW   Glucose (mg/dL)   Date Value   10/21/2024 134 (H)   04/22/2024 138 (H)   10/16/2023 123 (H)     Hemoglobin A1C (%)   Date Value   10/21/2024 5.8 (H)   04/22/2024 6.7 (H)   10/16/2023 6.9 (H)     Bicarbonate (mmol/L)   Date Value   10/21/2024 27   04/22/2024 28   10/16/2023 27     Urea Nitrogen (mg/dL)   Date Value   10/21/2024 12   04/22/2024 10   10/16/2023 10     Creatinine (mg/dL)   Date Value   10/21/2024 0.70   04/22/2024 0.58   10/16/2023 0.58     Lab Results   Component Value Date    HGBA1C 5.8 (H) 10/21/2024    HGBA1C 6.7 (H) 04/22/2024    HGBA1C 6.9 (H) 10/16/2023     Lab Results   Component Value Date    CHOL 88 10/21/2024    CHOL 85 04/22/2024    CHOL 76 10/16/2023     Lab Results   Component Value Date    HDL 32.7 10/21/2024    HDL 35.3 04/22/2024    HDL 35.7 10/16/2023     Lab Results   Component Value Date    LDLCALC 33 10/21/2024    LDLCALC 25 04/22/2024    LDLCALC 15 10/16/2023     Lab Results   Component Value Date    TRIG 111 10/21/2024    TRIG 122 04/22/2024    TRIG 127 10/16/2023       DIABETES ASSESSMENT    CURRENT PHARMACOTHERAPY  - metformin  mg once daily (higher doses causes GI upset)   - farxiga 10 mg once daily   - rybelsus 7 mg once daily    SECONDARY PREVENTION  - Statin? Yes  - ACE-I/ARB? Yes    GLP1 Screening:   - pancreatitis: no   - MTC/MEN2: no     SMBG MEASUREMENTS: patient does not test    DISCUSSION:  Patients A1c came back at 5.8%, this is well below goal of 7%, congrats!   Patient is  tolerating current medication regimen:   Metformin: patient is inquiring about discontinuing the medication due to pill burden   Discussed discontinuing is appropriate   Rybelsus 7 mg:   Patient is reporting sulfur burps   Discussed OTC peppermint supplementation for alleviation of these burps and food tracking to help identify triggers   Farxiga: patient is tolerating the medication without issues     RECOMMENDATIONS/PLAN  1. Patients diabetes is well controlled with most recent A1c of 5.8 % (goal < 7 %).   - Continue all meds under the continuation of care with the referring provider and clinical pharmacy team.    Clinical Pharmacist follow up: 3 months    Thank you,  Hien Silva, PharmD    Verbal consent to manage patient's drug therapy was obtained from the patient. They were informed they may decline to participate or withdraw from participation in pharmacy services at any time.

## 2024-12-09 DIAGNOSIS — E11.9 CONTROLLED TYPE 2 DIABETES MELLITUS WITHOUT COMPLICATION, WITHOUT LONG-TERM CURRENT USE OF INSULIN (MULTI): ICD-10-CM

## 2024-12-30 DIAGNOSIS — I10 ESSENTIAL (PRIMARY) HYPERTENSION: ICD-10-CM

## 2024-12-30 RX ORDER — AMLODIPINE AND OLMESARTAN MEDOXOMIL 5; 20 MG/1; MG/1
1 TABLET ORAL DAILY
Qty: 90 TABLET | Refills: 1 | Status: SHIPPED | OUTPATIENT
Start: 2024-12-30

## 2025-02-12 NOTE — PROGRESS NOTES
Neuroimmunology/MS Clinic Follow-Up Progress Note    Chief Complaint  Follow up for MS      History of Present Illness     PRINCIPAL NEUROLOGIC DIAGNOSIS: REFUGIO     DISEASE SUMMARY/DIAGNOSTICS:  Onset: 2001  Diagnosis of MS: 2001 by MRI  Disease course at onset: R  Current disease course: R  Previous disease therapies: none  Current disease therapies: Copaxone since 2001  Most recent MRI brain: 7/21 prior 2018, stable with at least 1 periv and 2-3 juxtacort nonenhancing lesions, 2/23 stable, 1/24 stable  Most recent MRI cervical spine: 2018, cord lesions at C4-5, T1-2  Most recent MRI thoracic spine: unk  CSF: 2001, reportedly negative for OCB     Summary Hx:  In 2001, August, right eye vision loss diagnosed with ON, described as she was having a scotoma, later resolved, did not receive steroids. Worked up for other dx, such as Lyme, etc. No relapses since. On Copaxone, tired of injections in the sense that she gets ISR, not fully convinced she wants to switch but definitely considering. Did well over the years, no more relapses since on Copaxone, MRI have been stable. Last seen 1 year ago on 1/25/2024. She has been maintained on copaxone, 40 mg 3x/week.     Interval Hx:  Doing well at today's visit. Continues to take glatiramer acetate, 40 mg 3x/week. Continues to tolerate this very well. Previously said she has had injection site reactions in the past with flushing and chest tightness. Severe reaction has not happened recently, but did have a hip tightness sensation in December 2024. She reports no new symptoms concerning for relapse. Vision is very stable, denies any other new symptoms including weakness, sensory loss, falls, incoordination.     She is asking about duration of needing copaxone. She has had over 20 years without relapse on this.     Current Medications      Current Outpatient Medications:     amlodipine-olmesartan (Vishnu) 5-20 mg tablet, TAKE 1 TABLET BY MOUTH EVERY DAY, Disp: 90 tablet, Rfl: 1     aspirin-calcium carbonate 81 mg-300 mg calcium(777 mg) tablet, Take 1 tablet by mouth once daily., Disp: , Rfl:     cholecalciferol (Vitamin D-3) 1,250 mcg (50,000 unit) capsule, TAKE 1 CAPSULE BY MOUTH ONE TIME PER WEEK, Disp: 12 capsule, Rfl: 1    dapagliflozin propanediol (Farxiga) 10 mg, Take 1 tablet (10 mg) by mouth once daily in the morning. Take before meals., Disp: 90 tablet, Rfl: 1    fluticasone (Flonase) 50 mcg/actuation nasal spray, USE 2 SPRAYS INTO EACH NOSTRIL ONCE DAILY, Disp: 48 mL, Rfl: 1    glatiramer (COPAXONE) 40 mg/mL syringe, Inject 40 mg under the skin 3 times a week., Disp: 12 mL, Rfl: 5    icosapent ethyL (Vascepa) 1 gram capsule, TAKE 2 CAPSULES BY MOUTH TWICE A DAY, Disp: 360 capsule, Rfl: 1    metFORMIN  mg 24 hr tablet, TAKE 1 TABLET BY MOUTH EVERY DAY WITH THE EVENING MEAL, Disp: 90 tablet, Rfl: 1    rosuvastatin (Crestor) 20 mg tablet, TAKE 1 TABLET BY MOUTH EVERY DAY, Disp: 90 tablet, Rfl: 1         Physical Exam  The patient was alert and oriented to person, place, and time with normal language, attention and concentration, recent and remote memory, praxis, and intellectual function. Normal fund of knowledge.     Eye movements: normal fixation, no spontaneous or gaze-evoked nystagmus, normal speed and amplitude of horizontal and vertical saccades, no saccadic dysmetria, normal horizontal smooth pursuit. Facial sensation to light touch and pinprick was normal. Muscles of mastication and facial expression moved normally. There was no dysarthria.     Muscle Strenght (#/5):  Upper extremity                              Deltoids Right 5 Left 5  Biceps Right 5 Left 5  Triceps Right 5 Left 5   Right 5 Left 5  ADAM Right 5 Left 5  Lower extremity                              Iliopsoas Right 5 Left 5  Quadriceps Right 5 Left 5  Hamstrings Right 5 Left 5  Tibialis ant Right 5 Left 5  Gastrocn Right 5 Left 5     Light touch normal.     Standard gait was normal.     1/2024  25-foot  walk (sec):  5.82 Assistive device: None.  9-Hole peg test (sec) right: 20.54   left:  21.97    2/2025  25-foot walk (sec):  5.12 seconds Assistive device: None.  9-Hole peg test (sec) right: 25.16 left:  27.14     Assessment  57 yo woman with known REFUGIO stable on Copaxone, which we decided to continue due to being stable clinically and by MRI. She has remained on copaxone with no new clinical relapses or MRI-based lesions. Neurologic exam is stable today. She is interested in potentially coming off of this. We had a discussion today that, while she is still relatively young, that her disease has been very stable for 20+ years. It may be reasonable to consider coming off copaxone, but this is not without risk. At this time, she wishes to continue her treatment and will revisit this at a later date.     Plan/Recommendations  - Repeat annual MRI brain w/ and w/o contrast  - 12 month follow-up  - MRI brain w/w/o every 2 years  - Continue Copaxone, 40 mg 3x/week, counseled on adverse reactions    Brando Elizabeth, PGY-4

## 2025-02-13 ENCOUNTER — OFFICE VISIT (OUTPATIENT)
Dept: NEUROLOGY | Facility: CLINIC | Age: 60
End: 2025-02-13
Payer: COMMERCIAL

## 2025-02-13 VITALS
WEIGHT: 221 LBS | RESPIRATION RATE: 18 BRPM | BODY MASS INDEX: 34.61 KG/M2 | HEART RATE: 73 BPM | DIASTOLIC BLOOD PRESSURE: 68 MMHG | SYSTOLIC BLOOD PRESSURE: 126 MMHG

## 2025-02-13 DIAGNOSIS — G35 RELAPSING REMITTING MULTIPLE SCLEROSIS (MULTI): Primary | ICD-10-CM

## 2025-02-13 DIAGNOSIS — G35 MULTIPLE SCLEROSIS (MULTI): ICD-10-CM

## 2025-02-13 PROCEDURE — 3074F SYST BP LT 130 MM HG: CPT | Performed by: PSYCHIATRY & NEUROLOGY

## 2025-02-13 PROCEDURE — 3078F DIAST BP <80 MM HG: CPT | Performed by: PSYCHIATRY & NEUROLOGY

## 2025-02-13 PROCEDURE — 1036F TOBACCO NON-USER: CPT | Performed by: PSYCHIATRY & NEUROLOGY

## 2025-02-13 PROCEDURE — 99214 OFFICE O/P EST MOD 30 MIN: CPT | Mod: GC | Performed by: PSYCHIATRY & NEUROLOGY

## 2025-02-13 PROCEDURE — 99214 OFFICE O/P EST MOD 30 MIN: CPT | Performed by: PSYCHIATRY & NEUROLOGY

## 2025-02-13 RX ORDER — GLATIRAMER 40 MG/ML
40 INJECTION, SOLUTION SUBCUTANEOUS 3 TIMES WEEKLY
Qty: 12 ML | Refills: 5 | Status: SHIPPED | OUTPATIENT
Start: 2025-02-14

## 2025-02-13 ASSESSMENT — PAIN SCALES - GENERAL: PAINLEVEL_OUTOF10: 0-NO PAIN

## 2025-02-13 NOTE — PATIENT INSTRUCTIONS
It was a pleasure seeing you in clinic today. At today's visit, we discussed your Multiple Sclerosis. You are doing very well on your glatiramer acetate. Please note that there is a new black box warning for this medication causing anaphylaxis (severe allergy) reactions, although this is rare and you have never demonstrated this. We discussed potentially taking you off this medication at some point. This is an individualized decision and we can actually consider this later on.     Your Instructions:  - Please make an appointment in 12 months on your way out today or call in to schedule at your convenience  - Please call to get your MRI brain scheduled    How To Get In Touch With Dr. Plaza and Staff:  For all scheduling, non-urgent, and routine questions or concerns, please call the Office Number: [phone]  For urgent concerns  that must be addressed same-day, please call [phone] and leave a voicemail. I will personally respond to this within 24 hours.  For emergency concerns, please dial 911 or present to the nearest emergency room.    All the Best,    Dr. Brando Elizabeth MD  Neurology Resident, PGY-4

## 2025-02-20 ENCOUNTER — APPOINTMENT (OUTPATIENT)
Dept: PHARMACY | Facility: HOSPITAL | Age: 60
End: 2025-02-20
Payer: COMMERCIAL

## 2025-02-20 DIAGNOSIS — E11.9 CONTROLLED TYPE 2 DIABETES MELLITUS WITHOUT COMPLICATION, WITHOUT LONG-TERM CURRENT USE OF INSULIN (MULTI): Primary | ICD-10-CM

## 2025-02-20 NOTE — PROGRESS NOTES
Pharmacist Clinic: Diabetes Management  Thea Marcial is a 59 y.o. female was referred to Clinical Pharmacy Team for diabetes management.     Referring Provider: Christopher D'Amico, DO     HISTORY OF PRESENT ILLNESS  Approximate Date of Diagnosis: 2+ years ago   Known complications due to diabetes included obesity; BMI of 37.59    Diet:   - Patient works second shift, she has 1-2 meals per day and overall eats relatively healthy  - She meal preps dinners for the week     LAB REVIEW   Glucose (mg/dL)   Date Value   10/21/2024 134 (H)   04/22/2024 138 (H)   10/16/2023 123 (H)     Hemoglobin A1C (%)   Date Value   10/21/2024 5.8 (H)   04/22/2024 6.7 (H)   10/16/2023 6.9 (H)     Bicarbonate (mmol/L)   Date Value   10/21/2024 27   04/22/2024 28   10/16/2023 27     Urea Nitrogen (mg/dL)   Date Value   10/21/2024 12   04/22/2024 10   10/16/2023 10     Creatinine (mg/dL)   Date Value   10/21/2024 0.70   04/22/2024 0.58   10/16/2023 0.58     Lab Results   Component Value Date    HGBA1C 5.8 (H) 10/21/2024    HGBA1C 6.7 (H) 04/22/2024    HGBA1C 6.9 (H) 10/16/2023     Lab Results   Component Value Date    CHOL 88 10/21/2024    CHOL 85 04/22/2024    CHOL 76 10/16/2023     Lab Results   Component Value Date    HDL 32.7 10/21/2024    HDL 35.3 04/22/2024    HDL 35.7 10/16/2023     Lab Results   Component Value Date    LDLCALC 33 10/21/2024    LDLCALC 25 04/22/2024    LDLCALC 15 10/16/2023     Lab Results   Component Value Date    TRIG 111 10/21/2024    TRIG 122 04/22/2024    TRIG 127 10/16/2023       DIABETES ASSESSMENT    CURRENT PHARMACOTHERAPY  - farxiga 10 mg once daily   - rybelsus 7 mg once daily    SECONDARY PREVENTION  - Statin? Yes  - ACE-I/ARB? Yes    GLP1 Screening:   - pancreatitis: no   - MTC/MEN2: no     HISTORICAL PHARMACOTHERAPY   - metformin  mg once daily (higher doses causes GI upset): patient discontinued when we started rybselsus     SMBG MEASUREMENTS: patient does not test    DISCUSSION:  Patients A1c  came back at 5.8%, this is well below goal of 7%, congrats!   Patient is tolerating current medication regimen:   Rybelsus 7 mg:   Patient is reporting sulfur burps have subsided   Discussed OTC peppermint supplementation for alleviation of these burps and food tracking to help identify triggers   Farxiga: patient is tolerating the medication without issues     RECOMMENDATIONS/PLAN  1. Patients diabetes is well controlled with most recent A1c of 5.8 % (goal < 7 %).   - Continue all meds under the continuation of care with the referring provider and clinical pharmacy team.    Clinical Pharmacist follow up: as needed, patient confirmed she will attend her next PCP visit     Thank you,  Hien Silva, PharmD    Verbal consent to manage patient's drug therapy was obtained from the patient. They were informed they may decline to participate or withdraw from participation in pharmacy services at any time.

## 2025-03-05 ENCOUNTER — APPOINTMENT (OUTPATIENT)
Dept: RADIOLOGY | Facility: CLINIC | Age: 60
End: 2025-03-05
Payer: COMMERCIAL

## 2025-03-05 DIAGNOSIS — G35 RELAPSING REMITTING MULTIPLE SCLEROSIS (MULTI): ICD-10-CM

## 2025-03-05 PROCEDURE — 2550000001 HC RX 255 CONTRASTS: Performed by: STUDENT IN AN ORGANIZED HEALTH CARE EDUCATION/TRAINING PROGRAM

## 2025-03-05 PROCEDURE — 70553 MRI BRAIN STEM W/O & W/DYE: CPT

## 2025-03-05 PROCEDURE — A9575 INJ GADOTERATE MEGLUMI 0.1ML: HCPCS | Performed by: STUDENT IN AN ORGANIZED HEALTH CARE EDUCATION/TRAINING PROGRAM

## 2025-03-05 PROCEDURE — 70553 MRI BRAIN STEM W/O & W/DYE: CPT | Performed by: RADIOLOGY

## 2025-03-05 RX ORDER — GADOTERATE MEGLUMINE 376.9 MG/ML
20 INJECTION INTRAVENOUS
Status: COMPLETED | OUTPATIENT
Start: 2025-03-05 | End: 2025-03-05

## 2025-03-05 RX ADMIN — GADOTERATE MEGLUMINE 20 ML: 376.9 INJECTION INTRAVENOUS at 09:00

## 2025-03-19 DIAGNOSIS — E55.9 VITAMIN D DEFICIENCY, UNSPECIFIED: ICD-10-CM

## 2025-03-19 RX ORDER — ASPIRIN 325 MG
50000 TABLET, DELAYED RELEASE (ENTERIC COATED) ORAL
Qty: 12 CAPSULE | Refills: 1 | Status: SHIPPED | OUTPATIENT
Start: 2025-03-23

## 2025-03-20 DIAGNOSIS — E11.9 TYPE 2 DIABETES MELLITUS WITHOUT COMPLICATIONS: ICD-10-CM

## 2025-03-24 RX ORDER — DAPAGLIFLOZIN 10 MG/1
10 TABLET, FILM COATED ORAL
Qty: 90 TABLET | Refills: 1 | Status: SHIPPED | OUTPATIENT
Start: 2025-03-24

## 2025-03-26 DIAGNOSIS — E78.1 PURE HYPERGLYCERIDEMIA: ICD-10-CM

## 2025-03-26 RX ORDER — ROSUVASTATIN CALCIUM 20 MG/1
20 TABLET, COATED ORAL DAILY
Qty: 90 TABLET | Refills: 0 | Status: SHIPPED | OUTPATIENT
Start: 2025-03-26

## 2025-03-31 DIAGNOSIS — E11.9 CONTROLLED TYPE 2 DIABETES MELLITUS WITHOUT COMPLICATION, WITHOUT LONG-TERM CURRENT USE OF INSULIN: ICD-10-CM

## 2025-03-31 DIAGNOSIS — G35 MULTIPLE SCLEROSIS (MULTI): ICD-10-CM

## 2025-03-31 DIAGNOSIS — E78.1 FAMILIAL HYPERTRIGLYCERIDEMIA: ICD-10-CM

## 2025-03-31 DIAGNOSIS — I10 BENIGN ESSENTIAL HYPERTENSION: Primary | ICD-10-CM

## 2025-04-07 ASSESSMENT — PROMIS GLOBAL HEALTH SCALE
EMOTIONAL_PROBLEMS: NEVER
RATE_AVERAGE_PAIN: 0
RATE_MENTAL_HEALTH: VERY GOOD
RATE_GENERAL_HEALTH: VERY GOOD
RATE_PHYSICAL_HEALTH: VERY GOOD
CARRYOUT_PHYSICAL_ACTIVITIES: COMPLETELY
RATE_AVERAGE_FATIGUE: MILD
CARRYOUT_SOCIAL_ACTIVITIES: VERY GOOD
RATE_QUALITY_OF_LIFE: VERY GOOD
RATE_SOCIAL_SATISFACTION: VERY GOOD

## 2025-04-08 LAB
ALBUMIN SERPL-MCNC: 4.2 G/DL (ref 3.6–5.1)
ALP SERPL-CCNC: 56 U/L (ref 37–153)
ALT SERPL-CCNC: 23 U/L (ref 6–29)
ANION GAP SERPL CALCULATED.4IONS-SCNC: 8 MMOL/L (CALC) (ref 7–17)
AST SERPL-CCNC: 18 U/L (ref 10–35)
BILIRUB SERPL-MCNC: 0.6 MG/DL (ref 0.2–1.2)
BUN SERPL-MCNC: 10 MG/DL (ref 7–25)
CALCIUM SERPL-MCNC: 9.2 MG/DL (ref 8.6–10.4)
CHLORIDE SERPL-SCNC: 104 MMOL/L (ref 98–110)
CHOLEST SERPL-MCNC: 88 MG/DL
CHOLEST/HDLC SERPL: 2.3 (CALC)
CO2 SERPL-SCNC: 28 MMOL/L (ref 20–32)
CREAT SERPL-MCNC: 0.53 MG/DL (ref 0.5–1.03)
EGFRCR SERPLBLD CKD-EPI 2021: 107 ML/MIN/1.73M2
ERYTHROCYTE [DISTWIDTH] IN BLOOD BY AUTOMATED COUNT: 12.4 % (ref 11–15)
EST. AVERAGE GLUCOSE BLD GHB EST-MCNC: 137 MG/DL
EST. AVERAGE GLUCOSE BLD GHB EST-SCNC: 7.6 MMOL/L
GLUCOSE SERPL-MCNC: 115 MG/DL (ref 65–99)
HBA1C MFR BLD: 6.4 % OF TOTAL HGB
HCT VFR BLD AUTO: 43.4 % (ref 35–45)
HDLC SERPL-MCNC: 39 MG/DL
HGB BLD-MCNC: 14.3 G/DL (ref 11.7–15.5)
LDLC SERPL CALC-MCNC: 33 MG/DL (CALC)
MCH RBC QN AUTO: 28.4 PG (ref 27–33)
MCHC RBC AUTO-ENTMCNC: 32.9 G/DL (ref 32–36)
MCV RBC AUTO: 86.3 FL (ref 80–100)
NONHDLC SERPL-MCNC: 49 MG/DL (CALC)
PLATELET # BLD AUTO: 311 THOUSAND/UL (ref 140–400)
PMV BLD REES-ECKER: 9.2 FL (ref 7.5–12.5)
POTASSIUM SERPL-SCNC: 4.3 MMOL/L (ref 3.5–5.3)
PROT SERPL-MCNC: 6.8 G/DL (ref 6.1–8.1)
RBC # BLD AUTO: 5.03 MILLION/UL (ref 3.8–5.1)
SODIUM SERPL-SCNC: 140 MMOL/L (ref 135–146)
TRIGL SERPL-MCNC: 80 MG/DL
WBC # BLD AUTO: 8 THOUSAND/UL (ref 3.8–10.8)

## 2025-04-09 ENCOUNTER — APPOINTMENT (OUTPATIENT)
Dept: PRIMARY CARE | Facility: CLINIC | Age: 60
End: 2025-04-09
Payer: COMMERCIAL

## 2025-04-09 VITALS
HEIGHT: 67 IN | HEART RATE: 75 BPM | WEIGHT: 218 LBS | DIASTOLIC BLOOD PRESSURE: 73 MMHG | OXYGEN SATURATION: 95 % | BODY MASS INDEX: 34.21 KG/M2 | SYSTOLIC BLOOD PRESSURE: 122 MMHG

## 2025-04-09 DIAGNOSIS — E78.1 PURE HYPERGLYCERIDEMIA: ICD-10-CM

## 2025-04-09 DIAGNOSIS — I10 ESSENTIAL (PRIMARY) HYPERTENSION: ICD-10-CM

## 2025-04-09 DIAGNOSIS — G35 MULTIPLE SCLEROSIS (MULTI): ICD-10-CM

## 2025-04-09 DIAGNOSIS — E11.9 CONTROLLED TYPE 2 DIABETES MELLITUS WITHOUT COMPLICATION, WITHOUT LONG-TERM CURRENT USE OF INSULIN: ICD-10-CM

## 2025-04-09 DIAGNOSIS — I10 BENIGN ESSENTIAL HYPERTENSION: Primary | ICD-10-CM

## 2025-04-09 DIAGNOSIS — E11.9 TYPE 2 DIABETES MELLITUS WITHOUT COMPLICATIONS: ICD-10-CM

## 2025-04-09 DIAGNOSIS — E78.6 LOW HDL (UNDER 40): ICD-10-CM

## 2025-04-09 DIAGNOSIS — E78.1 FAMILIAL HYPERTRIGLYCERIDEMIA: ICD-10-CM

## 2025-04-09 PROCEDURE — 3078F DIAST BP <80 MM HG: CPT | Performed by: STUDENT IN AN ORGANIZED HEALTH CARE EDUCATION/TRAINING PROGRAM

## 2025-04-09 PROCEDURE — 99214 OFFICE O/P EST MOD 30 MIN: CPT | Performed by: STUDENT IN AN ORGANIZED HEALTH CARE EDUCATION/TRAINING PROGRAM

## 2025-04-09 PROCEDURE — 1036F TOBACCO NON-USER: CPT | Performed by: STUDENT IN AN ORGANIZED HEALTH CARE EDUCATION/TRAINING PROGRAM

## 2025-04-09 PROCEDURE — 3008F BODY MASS INDEX DOCD: CPT | Performed by: STUDENT IN AN ORGANIZED HEALTH CARE EDUCATION/TRAINING PROGRAM

## 2025-04-09 PROCEDURE — 3074F SYST BP LT 130 MM HG: CPT | Performed by: STUDENT IN AN ORGANIZED HEALTH CARE EDUCATION/TRAINING PROGRAM

## 2025-04-09 RX ORDER — DAPAGLIFLOZIN 10 MG/1
10 TABLET, FILM COATED ORAL
Qty: 100 TABLET | Refills: 1 | Status: SHIPPED | OUTPATIENT
Start: 2025-04-09

## 2025-04-09 RX ORDER — AMLODIPINE AND OLMESARTAN MEDOXOMIL 5; 20 MG/1; MG/1
1 TABLET ORAL DAILY
Qty: 100 TABLET | Refills: 1 | Status: SHIPPED | OUTPATIENT
Start: 2025-04-09

## 2025-04-09 RX ORDER — ICOSAPENT ETHYL 1 G/1
CAPSULE ORAL
Qty: 400 CAPSULE | Refills: 1 | Status: SHIPPED | OUTPATIENT
Start: 2025-04-09

## 2025-04-09 RX ORDER — ROSUVASTATIN CALCIUM 20 MG/1
20 TABLET, COATED ORAL DAILY
Qty: 100 TABLET | Refills: 1 | Status: SHIPPED | OUTPATIENT
Start: 2025-04-09

## 2025-04-09 ASSESSMENT — ENCOUNTER SYMPTOMS
OCCASIONAL FEELINGS OF UNSTEADINESS: 0
DEPRESSION: 0
LOSS OF SENSATION IN FEET: 0

## 2025-04-09 ASSESSMENT — COLUMBIA-SUICIDE SEVERITY RATING SCALE - C-SSRS
6. HAVE YOU EVER DONE ANYTHING, STARTED TO DO ANYTHING, OR PREPARED TO DO ANYTHING TO END YOUR LIFE?: NO
1. IN THE PAST MONTH, HAVE YOU WISHED YOU WERE DEAD OR WISHED YOU COULD GO TO SLEEP AND NOT WAKE UP?: NO
2. HAVE YOU ACTUALLY HAD ANY THOUGHTS OF KILLING YOURSELF?: NO

## 2025-04-09 NOTE — PROGRESS NOTES
59-year-old female presenting for follow-up on multiple concerns.  Doing relatively well without any significant new concerns or interval changes.    HTN  Stable, tolerating current regimen well.     DMII  Stable, tolerating current regimen well.  Has discontinued metformin.  Still having episodic belching but stable.     Low HDL, hypertriglyceridemia  Stable, tolerating statin and Vascepa, continued low HDL.     Vitamin D deficiency  Stable, tolerating current regimen well.     MS  Stable, doing very well on current regimen.  Following with neurology.    SocHx:   - Smoking: Never    12 point ROS reviewed and negative other than as stated in HPI      General: Alert, oriented, pleasant, in no acute distress  HEENT:      Head: normocephalic, atraumatic;      eyes: EOMI, no scleral icterus;   Neck: soft, supple, non-tender, no masses appreciated  CV: Heart with regular rate and rhythm, normal S1/S2, no murmurs  Lungs: CTAB without wheezing, rhonchi or rales; good respiratory effort, no increased work of breathing  Neuro: Cranial nerves grossly intact; alert and oriented, normal gait  Psych: Appropriate mood and affect    #HTN  -At goal in office  -Continue amlodipine-olmesartan 5-20 mg daily     #DMII  -Last A1C: 6.4, 04/2025  -Current Medications: Farxiga 10 mg daily, Rybelsus 7 mg daily  -No longer taking metformin since starting Rybelsus  -Ophthalmology UTD  - Diabetic foot exam negative 10/2024  -Statin: rosuvastatin 20 mg daily  -ASA: 81 mg daily  -ACE/ARB: Olmesartan 20 mg daily     #Low HDL #Hypertriglyceridemia  -HDL continuously below goal, recommend increased exercise  -Continue rosuvastatin 20 mg daily, Vascepa  - Triglycerides and LDL at goal     #Vitamin D deficiency  - Continue with 50,000 units monthly     #MS  -Stable  - Continue to follow with neurology    F/U 6 months or sooner if indicated, CPE at that time    Chris D'Amico, DO

## 2025-04-29 ENCOUNTER — APPOINTMENT (OUTPATIENT)
Dept: RADIOLOGY | Facility: HOSPITAL | Age: 60
End: 2025-04-29
Payer: COMMERCIAL

## 2025-04-29 ENCOUNTER — HOSPITAL ENCOUNTER (OUTPATIENT)
Dept: RADIOLOGY | Facility: HOSPITAL | Age: 60
Discharge: HOME | End: 2025-04-29
Payer: COMMERCIAL

## 2025-04-29 VITALS — BODY MASS INDEX: 34.21 KG/M2 | WEIGHT: 218 LBS | HEIGHT: 67 IN

## 2025-04-29 DIAGNOSIS — R92.30 BREAST DENSITY: Primary | ICD-10-CM

## 2025-04-29 DIAGNOSIS — N60.02 BREAST CYST, LEFT: ICD-10-CM

## 2025-04-29 PROCEDURE — 77061 BREAST TOMOSYNTHESIS UNI: CPT | Mod: LEFT SIDE | Performed by: RADIOLOGY

## 2025-04-29 PROCEDURE — 77065 DX MAMMO INCL CAD UNI: CPT | Mod: LEFT SIDE | Performed by: RADIOLOGY

## 2025-04-29 PROCEDURE — 77061 BREAST TOMOSYNTHESIS UNI: CPT | Mod: LT

## 2025-04-30 DIAGNOSIS — J30.2 OTHER SEASONAL ALLERGIC RHINITIS: ICD-10-CM

## 2025-04-30 RX ORDER — FLUTICASONE PROPIONATE 50 MCG
2 SPRAY, SUSPENSION (ML) NASAL DAILY
Qty: 48 ML | Refills: 1 | Status: SHIPPED | OUTPATIENT
Start: 2025-04-30

## 2025-05-31 DIAGNOSIS — E11.9 CONTROLLED TYPE 2 DIABETES MELLITUS WITHOUT COMPLICATION, WITHOUT LONG-TERM CURRENT USE OF INSULIN: ICD-10-CM

## 2025-06-03 RX ORDER — ORAL SEMAGLUTIDE 7 MG/1
TABLET ORAL
Qty: 90 TABLET | Refills: 1 | Status: SHIPPED | OUTPATIENT
Start: 2025-06-03

## 2025-06-13 DIAGNOSIS — G35 MULTIPLE SCLEROSIS (MULTI): Primary | ICD-10-CM

## 2025-06-13 RX ORDER — GLATIRAMER 40 MG/ML
40 INJECTION, SOLUTION SUBCUTANEOUS 3 TIMES WEEKLY
Qty: 12 ML | Refills: 5 | Status: SHIPPED | OUTPATIENT
Start: 2025-06-13

## 2025-08-05 DIAGNOSIS — E11.9 CONTROLLED TYPE 2 DIABETES MELLITUS WITHOUT COMPLICATION, WITHOUT LONG-TERM CURRENT USE OF INSULIN: ICD-10-CM

## 2025-08-28 ENCOUNTER — APPOINTMENT (OUTPATIENT)
Facility: CLINIC | Age: 60
End: 2025-08-28
Payer: COMMERCIAL

## 2025-09-04 ENCOUNTER — APPOINTMENT (OUTPATIENT)
Facility: CLINIC | Age: 60
End: 2025-09-04
Payer: COMMERCIAL

## 2025-09-11 ENCOUNTER — APPOINTMENT (OUTPATIENT)
Facility: CLINIC | Age: 60
End: 2025-09-11
Payer: COMMERCIAL

## 2025-10-16 ENCOUNTER — APPOINTMENT (OUTPATIENT)
Dept: PRIMARY CARE | Facility: CLINIC | Age: 60
End: 2025-10-16
Payer: COMMERCIAL